# Patient Record
Sex: FEMALE | Race: BLACK OR AFRICAN AMERICAN | NOT HISPANIC OR LATINO | Employment: UNEMPLOYED | URBAN - METROPOLITAN AREA
[De-identification: names, ages, dates, MRNs, and addresses within clinical notes are randomized per-mention and may not be internally consistent; named-entity substitution may affect disease eponyms.]

---
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
## 2022-05-10 ENCOUNTER — PATIENT OUTREACH (OUTPATIENT)
Dept: CARE COORDINATION | Facility: CLINIC | Age: 22
End: 2022-05-10

## 2022-05-10 DIAGNOSIS — F32.A DEPRESSION: Primary | ICD-10-CM

## 2022-06-21 ENCOUNTER — PATIENT OUTREACH (OUTPATIENT)
Dept: CARE COORDINATION | Facility: CLINIC | Age: 22
End: 2022-06-21

## 2023-06-11 ENCOUNTER — HOSPITAL ENCOUNTER (EMERGENCY)
Facility: CLINIC | Age: 23
Discharge: HOME | End: 2023-06-11
Attending: EMERGENCY MEDICINE | Admitting: EMERGENCY MEDICINE
Payer: COMMERCIAL

## 2023-06-11 VITALS
HEART RATE: 96 BPM | OXYGEN SATURATION: 96 % | BODY MASS INDEX: 20.73 KG/M2 | TEMPERATURE: 98.1 F | DIASTOLIC BLOOD PRESSURE: 84 MMHG | WEIGHT: 117 LBS | RESPIRATION RATE: 16 BRPM | SYSTOLIC BLOOD PRESSURE: 140 MMHG | HEIGHT: 63 IN

## 2023-06-11 DIAGNOSIS — F32.A DEPRESSION, UNSPECIFIED DEPRESSION TYPE: ICD-10-CM

## 2023-06-11 DIAGNOSIS — R45.4 ANGER: ICD-10-CM

## 2023-06-11 LAB — SARS-COV-2 RNA RESP QL NAA+PROBE: NEGATIVE

## 2023-06-11 PROCEDURE — 87635 SARS-COV-2 COVID-19 AMP PRB: CPT | Performed by: EMERGENCY MEDICINE

## 2023-06-11 PROCEDURE — 99283 EMERGENCY DEPT VISIT LOW MDM: CPT

## 2023-06-12 NOTE — ED PROVIDER NOTES
"  History     Chief Complaint:  Psychiatric Evaluation       HPI   Sara Brunner is a 22 year old female with history of ADHD who presents with anger and destructive behavior due to worsening anxiety and depression. She denies suicidal thoughts and thoughts of self harm. Here in the ED she declines to be seen by [facility]. Patient has therapist and has a follow up this week. She has been taking her psychiatric medications and denies recreational drug use. Patient's partner feels that she is safe to go home.  She feels better and does not want to be seen tonight.      Independent Historian:   Spouse/Partner - They report feeling that she is safe to go home.     Review of External Notes:   none       Medications:    The patient is not currently taking any prescribed medications.     Past Medical History:    The patient denies any significant past medical history.      Physical Exam     Patient Vitals for the past 24 hrs:   BP Temp Temp src Pulse Resp SpO2 Height Weight   06/11/23 2122 (!) 140/84 98.1  F (36.7  C) Temporal 96 16 96 % 1.6 m (5' 3\") 53.1 kg (117 lb)        Physical Exam  Nursing note and vitals reviewed.  Constitutional:  Appears comfortable.   Eyes:    Conjunctivae are normal. No jaundice.  Pupils equal  Cardiovascular:  Normal rate, regular rhythm.      Normal heart sounds.     No murmur heard.  Pulmonary/Chest:  Breath sounds clear. No respiratory distress.     No stridor.   Neurological:   Alert and appropriate. No focal weakness.    Skin:    Skin is warm and dry. No rash noted. No diaphoresis.   Psychiatric:   Patient makes poor eye contact has a flat affect but otherwise is appropriate.  She is not delusional or psychotic.  She is very engaging.    Emergency Department Course   Laboratory:  Labs Ordered and Resulted from Time of ED Arrival to Time of ED Departure   COVID-19 VIRUS (CORONAVIRUS) BY PCR - Normal       Result Value    SARS CoV2 PCR Negative            Emergency Department Course & " ----- Message from Ema Smith sent at 9/14/2018 11:35 AM PDT -----  Regarding: Prescription Question  Contact: [phone]  Hi Dr. Casanova,  I have been unable to fill my prescription for the Voltaren Gel because there were no usage directions for [facility] to put on the prescription. They've tried reaching your office but have not received a response. Can you or someone in the office look into this please?  Thank you!  Ema Smith   Assessments:  Interventions:  Medications - No data to display     Assessments:  2148 I obtained history and examined the patient as noted above.     Independent Interpretation (X-rays, CTs, rhythm strip):  None    Consultations/Discussion of Management or Tests:  None     Social Determinants of Health affecting care:   None    Disposition:  The patient was discharged to home.     Impression & Plan    Medical Decision Making:  Patient comes in with some increase in her anxiety and depression.  She is been angry recently.  After arrival, she feels like she is feeling better and does not want to be here.  Her partner is comfortable with her going home.  She has an appoint with a therapist this week and will keep it.  She will continue to take her medications.  I do not feel she is an immediate danger to herself or others denies suicidal ideation.  I feel she is safe to be home but will return to the ER if she does not feel safe and has suicidal thoughts or her emotions get out of control.      Keep your appoint with your therapist this week.  Continue your medications.  If it anytime you do not feel safe and need to be seen again, return to the emergency room.    Diagnosis:    ICD-10-CM    1. Depression, unspecified depression type  F32.A       2. Anger  R45.4              Jed Blanc  6/11/2023   Lamar Hannon MD Powell, Tracy Alan, MD  06/11/23 8113

## 2023-06-12 NOTE — ED TRIAGE NOTES
Patient presents to ED requesting psych eval.  She reports having destructive behaviors recently where she breaks things due to anger.  She feels these behaviors are due to worsening depression.  She takes medication for depression.  She denies suicidal thoughts or thoughts of self harm.     Triage Assessment     Row Name 06/11/23 2123       Triage Assessment (Adult)    Airway WDL WDL       Respiratory WDL    Respiratory WDL WDL       Skin Circulation/Temperature WDL    Skin Circulation/Temperature WDL WDL       Cardiac WDL    Cardiac WDL WDL       Peripheral/Neurovascular WDL    Peripheral Neurovascular WDL WDL       Cognitive/Neuro/Behavioral WDL    Cognitive/Neuro/Behavioral WDL X;all     Level of Consciousness alert    Arousal Level opens eyes spontaneously    Orientation oriented x 4    Speech clear    Mood/Behavior flat affect;hypoactive (quiet, withdrawn)

## 2023-06-12 NOTE — DISCHARGE INSTRUCTIONS
Keep your appoint with your therapist this week.  Continue your medications.  If it anytime you do not feel safe and need to be seen again, return to the emergency room.

## 2023-06-16 ENCOUNTER — PATIENT OUTREACH (OUTPATIENT)
Dept: CARE COORDINATION | Facility: CLINIC | Age: 23
End: 2023-06-16
Payer: COMMERCIAL

## 2023-07-06 ENCOUNTER — TELEPHONE (OUTPATIENT)
Dept: BEHAVIORAL HEALTH | Facility: CLINIC | Age: 23
End: 2023-07-06
Payer: COMMERCIAL

## 2023-07-06 NOTE — TELEPHONE ENCOUNTER
Pt is a(n) adult (18+ out of HS) Seeking as eval for Adult Mental Health DA for evaluation and recommendations..  Appointment scheduled by:  Parent/Guardian (Guardianship confirmed, run cost estimate.  If not, do not run)  Caller name:  Mother      Legal Guardianship Reviewed?  No  Honoring Choices Notified?  No       needed?  NO    Contact information verified/updated: Yes    Aliza Sanchez

## 2023-07-13 ENCOUNTER — TELEPHONE (OUTPATIENT)
Dept: BEHAVIORAL HEALTH | Facility: CLINIC | Age: 23
End: 2023-07-13
Payer: COMMERCIAL

## 2023-07-13 NOTE — TELEPHONE ENCOUNTER
Pt have a in-person appt on Wednesday 7/19/23 at 11am. Writer placed a appt reminder phone call on 7/13/23. Unable to get a hold of patient at:[phone]. Phone call drops. Call can not be connected.  Writer could not reach pt and leave a vm behind.

## 2023-07-19 ENCOUNTER — HOSPITAL ENCOUNTER (OUTPATIENT)
Dept: BEHAVIORAL HEALTH | Facility: CLINIC | Age: 23
Discharge: HOME | End: 2023-07-19
Attending: FAMILY MEDICINE | Admitting: FAMILY MEDICINE
Payer: COMMERCIAL

## 2023-07-19 ENCOUNTER — TELEPHONE (OUTPATIENT)
Dept: BEHAVIORAL HEALTH | Facility: CLINIC | Age: 23
End: 2023-07-19
Payer: COMMERCIAL

## 2023-07-19 DIAGNOSIS — F90.2 ATTENTION DEFICIT HYPERACTIVITY DISORDER (ADHD), COMBINED TYPE: ICD-10-CM

## 2023-07-19 DIAGNOSIS — F33.1 MODERATE EPISODE OF RECURRENT MAJOR DEPRESSIVE DISORDER (H): Primary | ICD-10-CM

## 2023-07-19 PROCEDURE — 90791 PSYCH DIAGNOSTIC EVALUATION: CPT | Performed by: COUNSELOR

## 2023-07-19 RX ORDER — SERTRALINE HYDROCHLORIDE 100 MG/1
TABLET, FILM COATED ORAL
COMMUNITY
Start: 2023-06-21

## 2023-07-19 RX ORDER — LISDEXAMFETAMINE DIMESYLATE 20 MG/1
CAPSULE ORAL
COMMUNITY
Start: 2023-05-22

## 2023-07-19 ASSESSMENT — ANXIETY QUESTIONNAIRES
1. FEELING NERVOUS, ANXIOUS, OR ON EDGE: NOT AT ALL
GAD7 TOTAL SCORE: 8
GAD7 TOTAL SCORE: 8
7. FEELING AFRAID AS IF SOMETHING AWFUL MIGHT HAPPEN: NOT AT ALL
2. NOT BEING ABLE TO STOP OR CONTROL WORRYING: NOT AT ALL
6. BECOMING EASILY ANNOYED OR IRRITABLE: NEARLY EVERY DAY
IF YOU CHECKED OFF ANY PROBLEMS ON THIS QUESTIONNAIRE, HOW DIFFICULT HAVE THESE PROBLEMS MADE IT FOR YOU TO DO YOUR WORK, TAKE CARE OF THINGS AT HOME, OR GET ALONG WITH OTHER PEOPLE: EXTREMELY DIFFICULT
3. WORRYING TOO MUCH ABOUT DIFFERENT THINGS: NOT AT ALL
5. BEING SO RESTLESS THAT IT IS HARD TO SIT STILL: MORE THAN HALF THE DAYS

## 2023-07-19 ASSESSMENT — COLUMBIA-SUICIDE SEVERITY RATING SCALE - C-SSRS
2. HAVE YOU ACTUALLY HAD ANY THOUGHTS OF KILLING YOURSELF?: NO
TOTAL  NUMBER OF INTERRUPTED ATTEMPTS LIFETIME: NO
6. HAVE YOU EVER DONE ANYTHING, STARTED TO DO ANYTHING, OR PREPARED TO DO ANYTHING TO END YOUR LIFE?: NO
ATTEMPT LIFETIME: NO
TOTAL  NUMBER OF ABORTED OR SELF INTERRUPTED ATTEMPTS LIFETIME: NO
1. HAVE YOU WISHED YOU WERE DEAD OR WISHED YOU COULD GO TO SLEEP AND NOT WAKE UP?: NO

## 2023-07-19 ASSESSMENT — PATIENT HEALTH QUESTIONNAIRE - PHQ9
SUM OF ALL RESPONSES TO PHQ QUESTIONS 1-9: 18
5. POOR APPETITE OR OVEREATING: NEARLY EVERY DAY

## 2023-07-19 NOTE — TELEPHONE ENCOUNTER
Added pt to [facility] referrals list as next LoC is not yet scheduled. Please f/u accordingly after clinical review.     Chacha Thomas MALDONADO  7.19.2023  [id number]      ----- Message from Daria Key [facility] sent at 7/19/2023  2:23 PM CDT -----  Regarding: referral  Transition Clinic Referral   [place]         Please Check Type of Referral Requested:       __X__THERAPY: The Transition clinic is able to schedule patients without current medical insurance; these patient will be referred to our Social Work Care Coordinator for Medical Insurance              Assistance. We are open for referral for psychotherapy. Patient is referred from:  [facility]      ____MEDICATION:  Referrals for Medication are ONLY accepted from the following areas (select): [facility] - [facility] PRIORITY                                       Suboxone and Opioid Management Referrals are automatically denied. [facility] cannot see patient without active medical insurance.     [facility] cannot accept patient with next level of care scheduled with PCP  The transition clinic cannot follow patients who are on a restricted recipient program.    GUARDIAN: If your patient is not their own Guardian, please provide the following:    Guardian Name:  Guardian Contact Information (Phone & Email) :  Guardian Address:     FOSTER CARE PROVIDER: If your patient lives at a Licensed Foster Care, please provide the following:    Foster Provider:  Foster Provider Contact Information (Phone & Email):  Foster Provider Address:         Referring Provider Contact Name: Daria Key; Phone Number: [phone]    Reason for Transition Clinic Referral: bridge services    Next Level of Care Patient Will Be Transitioned To: individual therapy  Provider(s)[facility]  Location TBD  Date/Time TBS    What Would Be Helpful from the Transition Clinic: bridge services     Needs: NO    Does Patient Have Access to Technology: yes    Patient E-mail Address:  [email or web address]    Current Patient Phone Number: [phone];     Clinician Gender Preference (if applicable): NO    Patient location preference: [place]

## 2023-07-19 NOTE — PROGRESS NOTES
"    [facility]      PATIENT'S NAME: Sara Campbell  PREFERRED NAME: Sara  PRONOUNS: she/her/hers     MRN: [id number]  : 2000  ADDRESS: [address] [place] [id number]  [facility]  [id number]  DATE OF SERVICE: 23  START TIME: 1130  END TIME: 1330  PREFERRED PHONE: [phone]  May we leave a program related message: Yes  SERVICE MODALITY:  In-person    [place] ADULT Mental Health DIAGNOSTIC ASSESSMENT    Identifying Information:  Patient is a 22 year old,    individual.  Patient was referred for an assessment by self .  Patient attended the session alone.    Chief Complaint:   The reason for seeking services at this time is: \" help with ADD/ADHD \"   The problem(s) began as a teenager. Patient has attempted to resolve these concerns in the past through therapy and medications.    Social/Family History:  Patient reported they grew up in [facility] at age 8.  They were raised by biological parents.  Parents stayed ..   Patient reported that their childhood was \"good\".  Patient described their current relationships with family of origin as \"I have been isolating though they are supportive\".      The patient describes their cultural background as \"Tongan, [place], White, Agnostic\".  Cultural influences and impact on patient's life structure, values, norms, and healthcare: Patient denies.  Contextual influences on patient's health include: Contextual Factors: Individual Factors Substance Use.  Cultural, Contextual, and socioeconomic factors do not affect the patient's access to services.  These factors will be addressed in the Preliminary Treatment plan.  Patient identified their preferred language to be English. Patient reported they do not  need the assistance of an  or other support involved in therapy.     Patient reported experienced significant delays in developmental tasks, " such as speech, walking.   Patient's highest education level was some college. Patient identified the following learning problems: attention and concentration.  Modifications will not be used to assist communication in therapy.   Patient reports they are  able to understand written materials.    Patient reported the following relationship history:  Patient currently is in a relationship.  Patient's current relationship status is partnered / significant other for two months.   Patient identified their sexual orientation as pansexual.  Patient reported having zero child(nakul). Patient identified partner, mother and siblings as part of their support system.  Patient identified the quality of these relationships as stable and meaningful.     Patient's current living/housing situation involves staying with mom, dad and younger sister.  They live with family and they report that housing is stable.     Patient is currently unemployed.  Patient reports their finances are obtained through parents.  Patient does identify finances as a current stressor.      Patient reported that they have not been involved with the legal system.   Patient denies being on probation / parole / under the jurisdiction of the court.      Patient's Strengths and Limitations:  Patient identified the following strengths or resources that will help them succeed in treatment: commitment to health and well being. Things that may interfere with the patient's success in treatment include: none identified.     Assessments:  The following assessments were completed by patient for this visit:  PHQ9:       7/19/2023    12:21 PM   PHQ-9 SCORE   PHQ-9 Total Score 18     GAD7:       7/19/2023    12:21 PM   LJ-7 SCORE   Total Score 8     CAGE-AID:       7/19/2023    12:21 PM   CAGE-AID Total Score   Total Score 4     PROMIS 10-Global Health (only subscores and total score):       7/19/2023    12:00 PM   PROMIS-10 Scores Only   Global Mental Health Score 6    Global Physical Health Score 15   PROMIS TOTAL - SUBSCORES 21     [place] Suicide Severity Rating Scale (Lifetime/Recent)      7/19/2023    12:00 PM   [place] Suicide Severity Rating (Lifetime/Recent)   1. Wish to be Dead (Lifetime) N   2. Non-Specific Active Suicidal Thoughts (Lifetime) N   Actual Attempt (Lifetime) N   Has subject engaged in non-suicidal self-injurious behavior? (Lifetime) Y   Has subject engaged in non-suicidal self-injurious behavior? (Past 3 Months) N   Interrupted Attempts (Lifetime) N   Aborted or Self-Interrupted Attempt (Lifetime) N   Preparatory Acts or Behavior (Lifetime) N   Calculated C-SSRS Risk Score (Lifetime/Recent) No Risk Indicated       Personal and Family Medical History:  Patient does not report a family history of mental health concerns.  Patient reports family history is not on file..     Patient does report Mental Health Diagnosis and/or Treatment.  Patient Patient reported the following previous diagnoses which include(s): ADHD and Depression.  Patient reported symptoms began age 14.   Patient has received mental health services in the past: therapy, medications.  Psychiatric Hospitalizations: None.  Patient denies a history of civil commitment.  Patient is receiving other mental health services.  These include primary care provider at Dr Granados.  For follow-up on TBS.         Patient has had a physical exam to rule out medical causes for current symptoms.  Date of last physical exam was within the past year. Client was encouraged to follow up with PCP if symptoms were to develop. The patient has a non-[place] Primary Care Provider. Their PCP is through [facility]..  Patient reports the following current dental concerns: Patient getting a lot of cavities.  Patient denies any issues with pain..   There are not significant appetite / nutritional concerns / weight changes. These may include: no concerns. Patient reports the following sleep concerns:  Sleep  "cycles change and will struggle to fall asleep.   Patient does not report a history of head injury / trauma / cognitive impairment.      Patient reports current meds as:   Outpatient Medications Marked as Taking for the 7/19/23 encounter (Hospital Encounter) with Daria Kye [facility]   Medication Sig     sertraline (ZOLOFT) 100 MG tablet      VYVANSE 20 MG capsule        Medication Adherence:  Patient reports not taking psychiatric medications as prescribed. Client states reason for medication non-adherence as \"just taking it because of my mom\". Strategies for addressing obstacles to medication adherence include discussing how it will improve mood. Client accepted strategies to improve medication adherence.    Patient Allergies:  No Known Allergies    Medical History:  History reviewed. No pertinent past medical history.      Current Mental Status Exam:   Appearance:  Appropriate    Eye Contact:  Good   Psychomotor:  Normal       Gait / station:  no problem  Attitude / Demeanor: Cooperative  Interested  Speech      Rate / Production: Normal/ Responsive      Volume:  Normal  volume      Language:  intact  Mood:   Normal  Affect:   Appropriate    Thought Content: Clear   Thought Process: Logical       Associations: No loosening of associations  Insight:   Good   Judgment:  Intact   Orientation:  All  Attention/concentration: Good        Substance Use:  Patient did not report a family history of substance use concerns; see medical history section for details.  Patient has not received chemical dependency treatment in the past.  Patient has not ever been to detox.      Patient is not currently receiving any chemical dependency treatment. Patient reported the following problems as a result of their substance use:  family problems.    Patient reports using alcohol 3 times per week and has 3 mixed drinks at a time. Patient first started drinking at age 22.  Patient reported date of last use was 7/18/23.  Patient reports " heaviest use is current use.  Patient denies using tobacco.  Patient denies using cannabis.  Patient denies using caffeine.  Patient reports using/abusing the following substance(s). Patient reported no other substance use.     Substance Use: substance use at school and cravings/urges to use    Based on the positive CAGE score and clinical interview there  are indications of drug or alcohol abuse. Diagnostic assessment for substance use disorder completed. Therapist did recommend client to reduce use or abstain from alcohol or substance use. Therapist did not recommend structured treatment and or community support (AA, 12 step group, etc.). Patient to continue to discuss substance use  in individual therapy.      Significant Losses / Trauma / Abuse / Neglect Issues:   Patient   did not serve in the .  There are indications or report of significant loss, trauma, abuse or neglect issues related to: are no indications and client denies any losses, trauma, abuse, or neglect concerns.  Concerns for possible neglect are not present.     Safety Assessment:   Patient denies current homicidal ideation and behaviors.  Patient denies current self-injurious ideation and behaviors.    Patient denied risk behaviors associated with substance use.  Patient denies any high risk behaviors associated with mental health symptoms.  Patient reports the following current concerns for their personal safety: None.  Patient reports there   are not firearms in the house.       There are no firearms in the home..    History of Safety Concerns:  Patient denied a history of homicidal ideation.     Patient denied a history of personal safety concerns.    Patient denied a history of assaultive behaviors.    Patient denied a history of sexual assault behaviors.     Patient denied a history of risk behaviors associated with substance use.  Patient denies any history of high risk behaviors associated with mental health symptoms.  Patient  reports the following protective factors:  future focused thinking    Risk Plan:  See Recommendations for Safety and Risk Management Plan    Review of Symptoms per patient report:   Depression: Change in sleep, Lack of interest, Excessive or inappropriate guilt, Change in energy level, Difficulties concentrating, Change in appetite, Feelings of hopelessness, Ruminations, Irritability, Feeling sad, down, or depressed and Withdrawn  Saritha:  No Symptoms  Psychosis: No Symptoms  Anxiety: Sleep disturbance and Irritability  Panic:  No symptoms  Post Traumatic Stress Disorder:  No Symptoms   Eating Disorder: No Symptoms  ADD / ADHD:  Inattentive, Difficulties listening, Poor task completion, Poor organizational skills, Distractibility, Forgetful, Interrupts, Intrudes, Impulsive, Restlessness/fidgety, Hyperverbal and Hyperactive  Conduct Disorder: No symptoms  Autism Spectrum Disorder: Sensory issues, struggles with non verbal facial expressions, struggles with developing, maintaining and understanding relationships  Obsessive Compulsive Disorder: Obsessions    Patient reports the following compulsive behaviors and treatment history: Patient denies.      Diagnostic Criteria:   Major Depressive Disorder  CRITERIA (A-C) REPRESENT A MAJOR DEPRESSIVE EPISODE - SELECT THESE CRITERIA  A) Recurrent episode(s) - symptoms have been present during the same 2-week period and represent a change from previous functioning 5 or more symptoms (required for diagnosis)   - Depressed mood. Note: In children and adolescents, can be irritable mood.     - Diminished interest or pleasure in all, or almost all, activities.    - Fatigue or loss of energy.    - Feelings of worthlessness or inappropriate guilt.    - Diminished ability to think or concentrate, or indecisiveness.   B) The symptoms cause clinically significant distress or impairment in social, occupational, or other important areas of functioning  C) The episode is not attributable to  the physiological effects of a substance or to another medical condition  D) The occurence of major depressive episode is not better explained by other thought / psychotic disorders  E) There has never been a manic episode or hypomanic episode    Functional Status:  Patient reports the following functional impairments:  management of the household and or completion of tasks, relationship(s) and self-care.     Nonprogrammatic care:  Patient is requesting basic services to address current mental health concerns.    Clinical Summary:  1. Reason for assessment: to determine level of care  .  2. Psychosocial, Cultural and Contextual Factors: Substance use  .  3. Principal DSM5 Diagnoses  (Sustained by DSM5 Criteria Listed Above):   296.32 (F33.1) Major Depressive Disorder, Recurrent Episode, Moderate _ and With anxious distress.  4. Other Diagnoses that is relevant to services:   Attention-Deficit/Hyperactivity Disorder  314.01 (F90.9) Unspecified Attention -Deficit / Hyperactivity Disorder per history  5. Provisional Diagnosis: Further diagnosis clarification will be beneficial.  6. Prognosis: Expect Improvement.  7. Likely consequences of symptoms if not treated: higher level of care.  8. Client strengths include:  supportive, wants to learn, willing to ask questions and willing to relate to others .     Recommendations:     1. Plan for Safety and Risk Management:   Safety and Risk: A safety and risk management plan has been developed including: When the [place] Suicide Severity Rating Scale has been completed, the patient identifies lifetime history of suicidal ideation and/or Suicidal Behavior that is greater than 10 years.      The recommendation is to provide the Brief Safety Plan:    Adult Short Safety Plan:   Name: Sara Campbell  YOB: 2000  Date: July 19, 2023   My primary care provider: Rekha Granados     My Triggers:  Substance Use .     Additional People, Places, and Things that I  can access for support: family, girlfriend                JOSE    Good Control  1. I feel good  2. No suicidal thoughts   3. Can work, sleep and play      Action Steps  1. Self-care: balanced meals, exercising, sleep practices, etc.  2. Take your medications as prescribed.  3. Continue meetings with therapist and prescriber.  4.  Do the healthy things that I enjoy.           YELLOW  Getting Worse  I have ANY of these:  1. I do not feel good  2. Difficulty Concentrating  3. Sleep is changing  4. Increase/Change in my thoughts to hurt self and/or others, but I can still manage and not act on it.   5. Not taking care of self.               Action Steps (in addition to the above):  1. Inform your therapist and psychiatric prescriber/PCP.  2. Keep taking your medications as prescribed.    3. Turn to people you can ask for help.  4. Use internal coping strategies -see below.  5. Create safe environment: notify friends/family of increase in symptoms             RED  Get Help  If I have ANY of these:  1. Current and uncontrollable thoughts and/or behaviors to hurt self and/or others.    Actions to manage my safety  1. Contact your emergency person   2. Call or Text [phone]  3. Call my crisis team- [facility] [phone] [facility] Emergencies  3. Or Call 911 or go to the emergency room right away          My Internal Coping Strategies include the following:  use my coping skills    [End for Brief Safety Plan]     Safety Concerns  How To Identify Situations That Make Your Mental Health Worse:  Triggers are things that make your mental health worse.  Look at the list below to help you find your triggers and what you can do about them.     1. Identify Early Warning Signs:    Sometimes symptoms return, even when people do their best to stay well. Symptoms can develop over a short period of time with little or no warning, but most of the time they emerge gradually over several weeks.  Early warning signs  are changes that people experience when a relapse is starting. Some early warning signs are common and others are not as common.   Common Early Warning Signs:    Feeling depressed or low, Feeling irritable, Feeling like not being around other people and Urges to use drugs or alcohol     2. Identify action steps to take when warning signs are noticed:    Taking Action- It is important to take action if you are experiencing early warning signs of a relapse.  The faster you act, the more likely it is that you can avoid a full relapse.  It is helpful to identify several specific ways to cope with symptoms.      The following is my list of symptoms and coping strategies that I can use when they are present:    Symptom Coping Strategies   Anxiety -Talk with someone in your support system and let him or her know how you are feeling.  -Use relaxation techniques such as deep breathing or imagery.  -Use positive affirmations to counteract negative self-talk such as  I am learning to let go of worry.    Depression - Schedule your day; include activities you have to do and activities you enjoy doing.  - Get some exercise - walk, run, bike, or swim.  - Give yourself credit for even the smallest things you get done.   Sleep Difficulties   - Go to sleep at the same time every day.  - Do something relaxing before bed, such as drinking herbal tea or listening to music.  - Avoid having discussions about upsetting topics before going to bed.   Delusions   - Distract yourself from the disturbing thought by doing something that requires your attention such as a puzzle.  - Check out your beliefs by talking to someone you trust.    Hallucinations   - Use headphones to listen to music.  - Tell voices to  stop  or say to yourself,  I am safe.   - Ignore the hallucinations as much as possible; focus on other things.   Concentration Difficulties - Minimize distractions so there is only one thing for you to focus on at a time.    - Ask the  person you are having a conversation with to slow down or repeat things you are unsure of.           .  Patient consented to co-developed safety plan.  Safety and risk management plan was completed.  Patient agreed to use safety plan should any safety concerns arise.  A copy was given to the patient..          Report to child / adult protection services was NA.     2. Patient's identified None identified.     3. Initial Treatment will focus on:    Depressed Mood - .  Attentional Problems - ..     4. Resources/Service Plan:    services are not indicated.   Modifications to assist communication are not indicated.   Additional disability accommodations are not indicated.      5. Collaboration:   Collaboration / coordination of treatment will be initiated with the following  support professionals: PCP   6.  Referrals:   The following referral(s) will be initiated: Outpatient Mental [facility]  Psychological Testing. Next Scheduled Appointment: Referrals placed through [facility].      A Release of Information has been obtained for the following: Emergency Contact.     Emergency Contact Delon Campbell was obtained.      Clinical Substantiation/medical necessity for the above recommendations:    Patient is a 22 year old who presents with concerns with mood.  Patient reports history of attention and concentration concerns, Depression concerns.  Patient has historically been able to manage symptoms through therapy and medications.     Patients acute suicide risk was determined to be  minimal due to the following factors: Patient denies current thoughts of suicidal ideation and self harm and reports ability to keep self safe.  Patient completed and reviewed safety plan with  and reports ability to follow plan.    Patient is not currently under the influence of alcohol or illicit substances, denies experiencing command hallucinations, and has no immediate access to firearms. Protective factors  include: Patient reports the following protective factors: dedication to family/friends, safe and stable environment, daily obligations, committment to well-being, sense of personal control or determination and access to a variety of clinical interventions    Patient identifies the following concerns with substance use: Patient denies current concerns though does report that alcohol is being used to manage mood as they report their medication are not effective.   discussed approaches to manage substance use and discussed substance use treatment history and support group participation.       Patient would benefit from more extensive mental health support such individual therapy to help mitigate risk of hospitalization or suicidality. Patient is in agreement with plan. Options for treatment and follow-up care were reviewed with the patient. Patient was engaged and actively involved in the decision making process. Patient verbalized understanding of the options discussed and was satisfied with the final plan.  Patient is referred to: individual therapy and psychological testing.  Patient declined alternative placement options at this time and agreed to reach out to  should this change.  Contact information was provided.      7. MIN:    MIN:  Discussed the general effects of drugs and alcohol on health and well-being and Discussed the impact of drugs and alcohol when used during pregnancy. Provider gave patient printed information about the  effects of chemical use on their health and well being. Recommendations:  To abstain from all mood altering substances .     8. Records:   These were reviewed at time of assessment.   Information in this assessment was obtained from the medical record and  provided by patient who is a good historian.    Patient will have open access to their mental health medical record.    9.   Interactive Complexity: No      Provider Name/ Credentials:  Daria MACIAS  [facility]  July 19, 2023

## 2023-07-20 ENCOUNTER — TELEPHONE (OUTPATIENT)
Dept: BEHAVIORAL HEALTH | Facility: CLINIC | Age: 23
End: 2023-07-20
Payer: COMMERCIAL

## 2023-07-20 NOTE — TELEPHONE ENCOUNTER
Lamar Cardozo, [facility]  P Tc Referrals For Review  Please proceed with scheduling this patient with Kely.  Please note that a DA is not necessary as one was completed yesterday by [facility].  Purpose of TC sessions will be to focus on immediate intervention strategies in order to avoid further life complications as well as hospitalizations.  Evaluate next step options with [facility].     Coordinator reached patient who prefers in person appointment. Appointment scheduled 7/262023 with Blayne    Linda Ayoub  Transition Clinic Coordinator  Date and Time: 07/20/23 8:22 AM        ----- Message from Daria Key [facility] sent at 7/19/2023  2:23 PM CDT -----  Regarding: referral  Transition Clinic Referral   [place]         Please Check Type of Referral Requested:       __X__THERAPY: The Transition clinic is able to schedule patients without current medical insurance; these patient will be referred to our Social Work Care Coordinator for Medical Insurance              Assistance. We are open for referral for psychotherapy. Patient is referred from:  [facility]      ____MEDICATION:  Referrals for Medication are ONLY accepted from the following areas (select): [facility] - HIGH PRIORITY                                       Suboxone and Opioid Management Referrals are automatically denied. TC Psychiatry cannot see patient without active medical insurance.     TC Psychiatry cannot accept patient with next level of care scheduled with PCP  The transition clinic cannot follow patients who are on a restricted recipient program.    GUARDIAN: If your patient is not their own Guardian, please provide the following:    Guardian Name:  Guardian Contact Information (Phone & Email) :  Guardian Address:     FOSTER CARE PROVIDER: If your patient lives at a Licensed Foster Care, please provide the following:    Foster Provider:  Foster Provider Contact Information (Phone & Email):  Foster Provider Address:          Referring Provider Contact Name: Daria Robertopina; Phone Number: [phone]    Reason for Transition Clinic Referral: bridge services    Next Level of Care Patient Will Be Transitioned To: individual therapy  Provider(s)[facility]  Location TBD  Date/Time TBS    What Would Be Helpful from the Transition Clinic: bridge services     Needs: NO    Does Patient Have Access to Technology: yes    Patient E-mail Address: [email or web address]    Current Patient Phone Number: [phone];     Clinician Gender Preference (if applicable): NO    [place] [facility]

## 2023-07-26 ENCOUNTER — OFFICE VISIT (OUTPATIENT)
Dept: BEHAVIORAL HEALTH | Facility: CLINIC | Age: 23
End: 2023-07-26
Payer: COMMERCIAL

## 2023-07-26 DIAGNOSIS — F90.2 ATTENTION DEFICIT HYPERACTIVITY DISORDER (ADHD), COMBINED TYPE: ICD-10-CM

## 2023-07-26 DIAGNOSIS — F33.1 MODERATE EPISODE OF RECURRENT MAJOR DEPRESSIVE DISORDER (H): Primary | ICD-10-CM

## 2023-07-26 ASSESSMENT — COLUMBIA-SUICIDE SEVERITY RATING SCALE - C-SSRS
6. HAVE YOU EVER DONE ANYTHING, STARTED TO DO ANYTHING, OR PREPARED TO DO ANYTHING TO END YOUR LIFE?: NO
1. SINCE LAST CONTACT, HAVE YOU WISHED YOU WERE DEAD OR WISHED YOU COULD GO TO SLEEP AND NOT WAKE UP?: NO
SUICIDE, SINCE LAST CONTACT: NO
TOTAL  NUMBER OF ABORTED OR SELF INTERRUPTED ATTEMPTS SINCE LAST CONTACT: NO
TOTAL  NUMBER OF INTERRUPTED ATTEMPTS SINCE LAST CONTACT: NO
2. HAVE YOU ACTUALLY HAD ANY THOUGHTS OF KILLING YOURSELF?: NO
ATTEMPT SINCE LAST CONTACT: NO

## 2023-07-26 NOTE — PROGRESS NOTES
"    Transition Clinic - Initial Visit Progress Note    Patient  Name: Sara Campbell, : 2000    Date:  2023       Service Type:  Mental Health Initial Visit     Visit Start Time: 1406 Visit End Time: 1446    Session Length:   TC Session Length: 45 (38-52 Minutes)    Visit #: 1    Attendees:  TC Attendees: Client alone    Service Modality:  Service Modality: In-Person    Informed Consent and Assessment Methods    This provider and patient discussed HIPAA, and the limits of confidentiality; including mandated reporting, the possibility of collaborative discussions with patient's primary care provider and the multidisciplinary team in the  clinic during consultation.  Discussed the no show policy, and the benefits and possible unintended consequences of therapy.  Patient indicated understanding Transition Clinic services are short term, solution focused, until a referral can be made and patient can bridge to long term therapy.  Patient verbally indicated understanding the informed consent.       Presenting Concerns/  Current Stressors:  Sara Campbell is a 22 year old identified female who was referred to the Transition Clinic by AC for individual bridging therapy while she waits for a long-term therapist. AC  placed referral to [facility] but pt denied having scheduled any appointments. None were found in epic at time of this session.    Sara \"Make\" Brigitte Campbell reports she has been experiencing depressive sxs since apx age 14 and that they have continued to intensify, not responding to treatment. Phe was diagnosed with ADHD as a teenager and was on medication, which she endorsed as helpful in finishing high school but unhelpful in reducing her depressive sxs. Pt reported she is not currently taking ADHD medication but continues to take zoloft despite feeling that it \"isn't doing anything.\" Pt shared the only thing she has found that makes her feel better is alcohol, which she first began " "drinking at age 22. Pt and writer reviewed DA completed on 7.19.2023 and writer provided education related to different types of depression as well as effects of alcohol on mental health. Writer guided discussion to assess for motivation and identify supports. Pt reported multiple protective factors, including family and friends and endorsed low motivation to continue to treat depressive sxs stating, \"what's the point? Nothing works.\" Pt is clearly frustrated over her depressive sxs and seeming inability, to date, to find relief. Throughout session, pt presented with flat affect and seemed younger than her age of 22, holding a stuffed animal throughout the session. She provided minimal and brief answers to questions and asked \"are we done? I'm just really tired.\" Pt agreed to a follow-up session, which was scheduled for 8.03.2023. PT denied current SI, plan or intent at the time of this session.     ASSESSMENT:  The following assessments were completed by patient for this visit:  PHQ9:       7/19/2023    12:21 PM   PHQ-9 SCORE   PHQ-9 Total Score 18     GAD7:       7/19/2023    12:21 PM   LJ-7 SCORE   Total Score 8     CAGE-AID:       7/19/2023    12:21 PM   CAGE-AID Total Score   Total Score 4     PROMIS 10-Global Health (only subscores and total score):       7/19/2023    12:00 PM   PROMIS-10 Scores Only   Global Mental Health Score 6   Global Physical Health Score 15   PROMIS TOTAL - SUBSCORES 21     [place] Suicide Severity Rating Scale (Short Version)      6/11/2023     9:27 PM 7/26/2023     2:00 PM   [place] Suicide Severity Rating (Short Version)   Over the past 2 weeks have you felt down, depressed, or hopeless? yes    Over the past 2 weeks have you had thoughts of killing yourself? no    Have you ever attempted to kill yourself? no    1. Wish to be Dead (Since Last Contact)  N   2. Non-Specific Active Suicidal Thoughts (Since Last Contact)  N   Actual Attempt (Since Last Contact)  N   Has subject engaged " in non-suicidal self-injurious behavior? (Since Last Contact)  N   Interrupted Attempts (Since Last Contact)  N   Aborted or Self-Interrupted Attempt (Since Last Contact)  N   Preparatory Acts or Behavior (Since Last Contact)  N   Suicide (Since Last Contact)  N   Calculated C-SSRS Risk Score (Since Last Contact)  No Risk Indicated       Mental Status Assessment:  Appearance:   Appropriate   Eye Contact:   Good   Psychomotor Behavior: Normal   Attitude:   Dallin Indifferent  Orientation:   All  Speech     Rate / Production:  Normal/ Responsive     Volume:   Normal   Mood:    Depressed   Affect:    Flat   Thought Content:  Clear   Thought Form:  Goal Directed   Insight:    Fair       Safety Issues and Plan for Safety and Risk Management:   Patient denies current fears or concerns for personal safety.  Patient denies current or recent suicidal ideation or behaviors.  Patient denies current or recent homicidal ideation or behaviors.  Patient denies current or recent self injurious behavior or ideation.  Patient denies other safety concerns.  Recommended that patient call 911 or go to the local ED should there be a change in any of these risk factors.  Patient reports there are no firearms in the house.     Diagnostic Criteria:  Adjustment Disorder  A. The development of emotional or behavioral symptoms in response to an identifiable stressor(s) occurring within 3 months of the onset of the stressor(s)  B. These symptoms or behaviors are clinically significant, as evidenced by one or both of the following:       - Marked distress that is out of proportion to the severity/intensity of the stressor (with consideration for external context & culture)       - Significant impairment in social, occupational, or other important areas of functioning  C. The stress-related disturbance does not meet criteria for another disorder & is not not an exacerbation of another mental disorder  D. The symptoms do not represent normal  bereavement  E. Once the stressor or its consequences have terminated, the symptoms do not persist for more than an additional 6 months       * Adjustment Disorder with Depressed Mood: The predominant manifestations are symptoms such as low mood, tearfulness, or feelings of hopelessness    DSM5 Diagnoses: (Sustained by DSM5 Criteria Listed Above)  Diagnoses: Adjustment Disorders  309.0 (F43.21) With depressed mood  Psychosocial & Contextual Factors: 22-year-old  female, living with parents and younger sister, some college, no employment, hx pos for ADHD and depression      Intervention:   Brief Psychotherapy - discussed and prioritizing the most efficient treatment., Motivational Interviewing - helping to find the motivation to make positive behavior changes., and Person-Centered Therapy - Actualizes potential and moves towards increased awareness, spontaneity, trust in self and inner directedness.    Collateral Reports Completed:  TC Collateral: [facility] electronic medical records reviewed.    DATA:  Interactive Complexity: No  Crisis: No    PLAN: (Homework, other):  Provider will continue Diagnostic Assessment. Initial Treatment will focus on: Depressed Mood - identify triggers and appropriate interventions .  2.   Provider recommended the following referrals: N/A.    3.   Information in this assessment was obtained from the medical record and provided by patient who is a good historian.   4.   Follow up scheduled:  8.03.2023        Patient was given the following to do until next session:  nothing assigned  5.  Anticipated Discharge: Anticipated Discharge Time: < 1 Month     Procedure Code:  Psychotherapy (with patient) - 45 [CPT 58332]    LUCIE Mckenzie, [facility]    Suicide Risk and Safety Concerns were assessed for. Patient meets the following risk assessment and triage: Patient has no change in safety concerns. Committed to safety and agreed to follow previously developed safety plan.

## 2023-08-03 ENCOUNTER — TELEPHONE (OUTPATIENT)
Dept: BEHAVIORAL HEALTH | Facility: CLINIC | Age: 23
End: 2023-08-03
Payer: COMMERCIAL

## 2023-08-03 NOTE — TELEPHONE ENCOUNTER
Clinician unable to connect with pt for this session. Writer was experiencing technical difficulties and when able to log back in for the session, sent multiple links to pt and called twice. Pt did not respond and it seemed that the call was declined with no option to leave a voicemail. Clinician will attempt to contact pt again on the next business day (tomorrow, 8.04.2023).    Kely Reyes [facility]  8.03.2023  [phone]

## 2023-08-16 ENCOUNTER — TELEPHONE (OUTPATIENT)
Dept: BEHAVIORAL HEALTH | Facility: CLINIC | Age: 23
End: 2023-08-16
Payer: COMMERCIAL

## 2023-08-16 NOTE — TELEPHONE ENCOUNTER
Pt did not attend session scheduled with this writer on this day at 1400. Writer called phone listed in Epic; call was declined twice and did not go to voicemail. Unable to leave message. This is second no-show and provider will assume pt is declining services.     Kely Reyes, [facility]  8.16.2023  [phone]

## 2025-04-24 ENCOUNTER — HOSPITAL ENCOUNTER (EMERGENCY)
Facility: CLINIC | Age: 25
End: 2025-04-24
Attending: EMERGENCY MEDICINE
Payer: COMMERCIAL

## 2025-04-24 DIAGNOSIS — R09.02 HYPOXIA: ICD-10-CM

## 2025-04-24 DIAGNOSIS — E87.20 ACIDOSIS: ICD-10-CM

## 2025-04-24 DIAGNOSIS — R45.1 AGITATION REQUIRING SEDATION PROTOCOL: ICD-10-CM

## 2025-04-24 DIAGNOSIS — F10.929 ALCOHOLIC INTOXICATION WITH COMPLICATION: ICD-10-CM

## 2025-04-24 LAB
ALBUMIN SERPL BCG-MCNC: 4.1 G/DL (ref 3.5–5.2)
ALP SERPL-CCNC: 78 U/L (ref 40–150)
ALT SERPL W P-5'-P-CCNC: 10 U/L (ref 0–50)
ANION GAP SERPL CALCULATED.3IONS-SCNC: 20 MMOL/L (ref 7–15)
AST SERPL W P-5'-P-CCNC: 22 U/L (ref 0–45)
BASOPHILS # BLD AUTO: 0 10E3/UL (ref 0–0.2)
BASOPHILS NFR BLD AUTO: 0 %
BILIRUB SERPL-MCNC: 0.2 MG/DL
BUN SERPL-MCNC: 7.5 MG/DL (ref 6–20)
CALCIUM SERPL-MCNC: 8.4 MG/DL (ref 8.8–10.4)
CHLORIDE SERPL-SCNC: 105 MMOL/L (ref 98–107)
CREAT SERPL-MCNC: 0.61 MG/DL (ref 0.51–0.95)
EGFRCR SERPLBLD CKD-EPI 2021: >90 ML/MIN/1.73M2
EOSINOPHIL # BLD AUTO: 0.2 10E3/UL (ref 0–0.7)
EOSINOPHIL NFR BLD AUTO: 2 %
ERYTHROCYTE [DISTWIDTH] IN BLOOD BY AUTOMATED COUNT: 11.5 % (ref 10–15)
ETHANOL SERPL-MCNC: 0.36 G/DL
GLUCOSE BLDC GLUCOMTR-MCNC: 260 MG/DL (ref 70–99)
GLUCOSE BLDC GLUCOMTR-MCNC: 66 MG/DL (ref 70–99)
GLUCOSE SERPL-MCNC: 76 MG/DL (ref 70–99)
HCG SERPL QL: NEGATIVE
HCO3 SERPL-SCNC: 15 MMOL/L (ref 22–29)
HCT VFR BLD AUTO: 42.2 % (ref 35–47)
HGB BLD-MCNC: 14.5 G/DL (ref 11.7–15.7)
IMM GRANULOCYTES # BLD: 0 10E3/UL
IMM GRANULOCYTES NFR BLD: 0 %
LYMPHOCYTES # BLD AUTO: 1.4 10E3/UL (ref 0.8–5.3)
LYMPHOCYTES NFR BLD AUTO: 14 %
MCH RBC QN AUTO: 32.2 PG (ref 26.5–33)
MCHC RBC AUTO-ENTMCNC: 34.4 G/DL (ref 31.5–36.5)
MCV RBC AUTO: 94 FL (ref 78–100)
MONOCYTES # BLD AUTO: 0.5 10E3/UL (ref 0–1.3)
MONOCYTES NFR BLD AUTO: 5 %
NEUTROPHILS # BLD AUTO: 7.6 10E3/UL (ref 1.6–8.3)
NEUTROPHILS NFR BLD AUTO: 78 %
NRBC # BLD AUTO: 0 10E3/UL
NRBC BLD AUTO-RTO: 0 /100
PLATELET # BLD AUTO: 276 10E3/UL (ref 150–450)
POTASSIUM SERPL-SCNC: 3.4 MMOL/L (ref 3.4–5.3)
PROT SERPL-MCNC: 7.2 G/DL (ref 6.4–8.3)
RBC # BLD AUTO: 4.51 10E6/UL (ref 3.8–5.2)
SODIUM SERPL-SCNC: 140 MMOL/L (ref 135–145)
WBC # BLD AUTO: 9.8 10E3/UL (ref 4–11)

## 2025-04-24 PROCEDURE — 82310 ASSAY OF CALCIUM: CPT | Performed by: EMERGENCY MEDICINE

## 2025-04-24 PROCEDURE — 82962 GLUCOSE BLOOD TEST: CPT

## 2025-04-24 PROCEDURE — 36415 COLL VENOUS BLD VENIPUNCTURE: CPT | Performed by: EMERGENCY MEDICINE

## 2025-04-24 PROCEDURE — 258N000001 HC RX 258: Performed by: EMERGENCY MEDICINE

## 2025-04-24 PROCEDURE — 96361 HYDRATE IV INFUSION ADD-ON: CPT | Performed by: EMERGENCY MEDICINE

## 2025-04-24 PROCEDURE — 96374 THER/PROPH/DIAG INJ IV PUSH: CPT | Performed by: EMERGENCY MEDICINE

## 2025-04-24 PROCEDURE — 82077 ASSAY SPEC XCP UR&BREATH IA: CPT | Performed by: EMERGENCY MEDICINE

## 2025-04-24 PROCEDURE — 258N000003 HC RX IP 258 OP 636: Performed by: EMERGENCY MEDICINE

## 2025-04-24 PROCEDURE — 85025 COMPLETE CBC W/AUTO DIFF WBC: CPT | Performed by: EMERGENCY MEDICINE

## 2025-04-24 PROCEDURE — 99285 EMERGENCY DEPT VISIT HI MDM: CPT | Mod: 25 | Performed by: EMERGENCY MEDICINE

## 2025-04-24 PROCEDURE — 84703 CHORIONIC GONADOTROPIN ASSAY: CPT | Performed by: EMERGENCY MEDICINE

## 2025-04-24 PROCEDURE — 99284 EMERGENCY DEPT VISIT MOD MDM: CPT | Performed by: EMERGENCY MEDICINE

## 2025-04-24 RX ORDER — DEXTROSE MONOHYDRATE 25 G/50ML
25 INJECTION, SOLUTION INTRAVENOUS ONCE
Status: COMPLETED | OUTPATIENT
Start: 2025-04-24 | End: 2025-04-24

## 2025-04-24 RX ADMIN — DEXTROSE MONOHYDRATE 25 ML: 25 INJECTION, SOLUTION INTRAVENOUS at 20:02

## 2025-04-24 RX ADMIN — SODIUM CHLORIDE 1000 ML: 0.9 INJECTION, SOLUTION INTRAVENOUS at 21:03

## 2025-04-24 ASSESSMENT — ACTIVITIES OF DAILY LIVING (ADL)
ADLS_ACUITY_SCORE: 41

## 2025-04-25 VITALS
RESPIRATION RATE: 18 BRPM | DIASTOLIC BLOOD PRESSURE: 77 MMHG | SYSTOLIC BLOOD PRESSURE: 102 MMHG | OXYGEN SATURATION: 96 % | TEMPERATURE: 98.6 F | HEART RATE: 100 BPM

## 2025-04-25 VITALS
DIASTOLIC BLOOD PRESSURE: 72 MMHG | RESPIRATION RATE: 16 BRPM | SYSTOLIC BLOOD PRESSURE: 98 MMHG | TEMPERATURE: 98.6 F | HEART RATE: 100 BPM | OXYGEN SATURATION: 99 %

## 2025-04-25 LAB
AMPHETAMINES UR QL SCN: ABNORMAL
BARBITURATES UR QL SCN: ABNORMAL
BENZODIAZ UR QL SCN: ABNORMAL
BZE UR QL SCN: ABNORMAL
CANNABINOIDS UR QL SCN: ABNORMAL
FENTANYL UR QL: ABNORMAL
OPIATES UR QL SCN: ABNORMAL
PCP QUAL URINE (ROCHE): ABNORMAL

## 2025-04-25 PROCEDURE — 80307 DRUG TEST PRSMV CHEM ANLYZR: CPT | Performed by: EMERGENCY MEDICINE

## 2025-04-25 ASSESSMENT — ACTIVITIES OF DAILY LIVING (ADL)
ADLS_ACUITY_SCORE: 41

## 2025-04-25 NOTE — ED NOTES
Bed: UREDH-A  Expected date:   Expected time:   Means of arrival:   Comments:  [id number], 24F, on hold
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
## 2025-05-16 ENCOUNTER — HOSPITAL ENCOUNTER (INPATIENT)
Facility: CLINIC | Age: 25
End: 2025-05-16
Attending: EMERGENCY MEDICINE | Admitting: PSYCHIATRY & NEUROLOGY
Payer: COMMERCIAL

## 2025-05-16 DIAGNOSIS — F32.9 MAJOR DEPRESSIVE DISORDER, REMISSION STATUS UNSPECIFIED, UNSPECIFIED WHETHER RECURRENT: ICD-10-CM

## 2025-05-16 DIAGNOSIS — F10.121 ALCOHOL INTOXICATION DELIRIUM: ICD-10-CM

## 2025-05-16 DIAGNOSIS — F90.2 ATTENTION DEFICIT HYPERACTIVITY DISORDER (ADHD), COMBINED TYPE: ICD-10-CM

## 2025-05-16 DIAGNOSIS — F10.20 ALCOHOL DEPENDENCE, CONTINUOUS DRINKING BEHAVIOR (H): Primary | ICD-10-CM

## 2025-05-16 DIAGNOSIS — R45.851 SUICIDAL IDEATION: ICD-10-CM

## 2025-05-16 DIAGNOSIS — F33.2 SEVERE RECURRENT MAJOR DEPRESSION WITHOUT PSYCHOTIC FEATURES (H): ICD-10-CM

## 2025-05-16 LAB — ALCOHOL BREATH TEST: 0.29 (ref 0–0.01)

## 2025-05-16 PROCEDURE — 99285 EMERGENCY DEPT VISIT HI MDM: CPT | Performed by: EMERGENCY MEDICINE

## 2025-05-16 PROCEDURE — 82075 ASSAY OF BREATH ETHANOL: CPT | Performed by: EMERGENCY MEDICINE

## 2025-05-16 RX ORDER — NALTREXONE HYDROCHLORIDE 50 MG/1
1 TABLET, FILM COATED ORAL
Status: ON HOLD | COMMUNITY
Start: 2025-04-18

## 2025-05-16 ASSESSMENT — COLUMBIA-SUICIDE SEVERITY RATING SCALE - C-SSRS
2. HAVE YOU ACTUALLY HAD ANY THOUGHTS OF KILLING YOURSELF IN THE PAST MONTH?: YES
6. HAVE YOU EVER DONE ANYTHING, STARTED TO DO ANYTHING, OR PREPARED TO DO ANYTHING TO END YOUR LIFE?: NO
5. HAVE YOU STARTED TO WORK OUT OR WORKED OUT THE DETAILS OF HOW TO KILL YOURSELF? DO YOU INTEND TO CARRY OUT THIS PLAN?: NO
1. IN THE PAST MONTH, HAVE YOU WISHED YOU WERE DEAD OR WISHED YOU COULD GO TO SLEEP AND NOT WAKE UP?: YES
4. HAVE YOU HAD THESE THOUGHTS AND HAD SOME INTENTION OF ACTING ON THEM?: NO
3. HAVE YOU BEEN THINKING ABOUT HOW YOU MIGHT KILL YOURSELF?: YES

## 2025-05-16 ASSESSMENT — ACTIVITIES OF DAILY LIVING (ADL): ADLS_ACUITY_SCORE: 41

## 2025-05-17 ENCOUNTER — TELEPHONE (OUTPATIENT)
Dept: BEHAVIORAL HEALTH | Facility: CLINIC | Age: 25
End: 2025-05-17
Payer: COMMERCIAL

## 2025-05-17 PROBLEM — F33.2 SEVERE RECURRENT MAJOR DEPRESSION WITHOUT PSYCHOTIC FEATURES (H): Status: ACTIVE | Noted: 2023-07-19

## 2025-05-17 PROBLEM — F32.9 MAJOR DEPRESSIVE DISORDER, REMISSION STATUS UNSPECIFIED, UNSPECIFIED WHETHER RECURRENT: Status: ACTIVE | Noted: 2025-05-17

## 2025-05-17 PROBLEM — R45.851 SUICIDAL IDEATION: Status: ACTIVE | Noted: 2025-05-17

## 2025-05-17 PROBLEM — F10.20 ALCOHOL DEPENDENCE, CONTINUOUS DRINKING BEHAVIOR (H): Status: ACTIVE | Noted: 2025-05-17

## 2025-05-17 PROBLEM — F10.121 ALCOHOL INTOXICATION DELIRIUM: Status: ACTIVE | Noted: 2025-05-17

## 2025-05-17 PROBLEM — F33.1 MODERATE EPISODE OF RECURRENT MAJOR DEPRESSIVE DISORDER (H): Status: ACTIVE | Noted: 2023-07-19

## 2025-05-17 PROCEDURE — 128N000002 HC R&B CD/MH ADOLESCENT

## 2025-05-17 PROCEDURE — 99222 1ST HOSP IP/OBS MODERATE 55: CPT | Performed by: PSYCHIATRY & NEUROLOGY

## 2025-05-17 PROCEDURE — 250N000013 HC RX MED GY IP 250 OP 250 PS 637: Performed by: EMERGENCY MEDICINE

## 2025-05-17 RX ORDER — HYDROXYZINE HYDROCHLORIDE 25 MG/1
25 TABLET, FILM COATED ORAL EVERY 4 HOURS PRN
Status: DISCONTINUED | OUTPATIENT
Start: 2025-05-17 | End: 2025-05-17

## 2025-05-17 RX ORDER — OLANZAPINE 10 MG/2ML
10 INJECTION, POWDER, FOR SOLUTION INTRAMUSCULAR 3 TIMES DAILY PRN
Status: ACTIVE | OUTPATIENT
Start: 2025-05-17

## 2025-05-17 RX ORDER — ACETAMINOPHEN 325 MG/1
650 TABLET ORAL EVERY 4 HOURS PRN
Status: DISPENSED | OUTPATIENT
Start: 2025-05-17

## 2025-05-17 RX ORDER — HYDROXYZINE HYDROCHLORIDE 25 MG/1
25 TABLET, FILM COATED ORAL EVERY 4 HOURS PRN
Status: DISPENSED | OUTPATIENT
Start: 2025-05-17

## 2025-05-17 RX ORDER — OLANZAPINE 10 MG/1
10 TABLET, FILM COATED ORAL 3 TIMES DAILY PRN
Status: ACTIVE | OUTPATIENT
Start: 2025-05-17

## 2025-05-17 RX ORDER — MAGNESIUM HYDROXIDE/ALUMINUM HYDROXICE/SIMETHICONE 120; 1200; 1200 MG/30ML; MG/30ML; MG/30ML
30 SUSPENSION ORAL EVERY 4 HOURS PRN
Status: ACTIVE | OUTPATIENT
Start: 2025-05-17

## 2025-05-17 RX ORDER — DIAZEPAM 5 MG/1
5-20 TABLET ORAL EVERY 30 MIN PRN
Status: DISCONTINUED | OUTPATIENT
Start: 2025-05-17 | End: 2025-05-19

## 2025-05-17 RX ORDER — ONDANSETRON 4 MG/1
4 TABLET, ORALLY DISINTEGRATING ORAL
Status: ACTIVE | OUTPATIENT
Start: 2025-05-17

## 2025-05-17 RX ORDER — OLANZAPINE 10 MG/1
10 TABLET, ORALLY DISINTEGRATING ORAL
Status: COMPLETED | OUTPATIENT
Start: 2025-05-17 | End: 2025-05-17

## 2025-05-17 RX ORDER — OLANZAPINE 10 MG/2ML
10 INJECTION, POWDER, FOR SOLUTION INTRAMUSCULAR
Status: COMPLETED | OUTPATIENT
Start: 2025-05-17 | End: 2025-05-17

## 2025-05-17 RX ORDER — POLYETHYLENE GLYCOL 3350 17 G/17G
17 POWDER, FOR SOLUTION ORAL DAILY PRN
Status: ACTIVE | OUTPATIENT
Start: 2025-05-17

## 2025-05-17 RX ADMIN — OLANZAPINE 10 MG: 10 TABLET, ORALLY DISINTEGRATING ORAL at 06:58

## 2025-05-17 ASSESSMENT — ACTIVITIES OF DAILY LIVING (ADL)
LAUNDRY: WITH SUPERVISION
ADLS_ACUITY_SCORE: 41
ADLS_ACUITY_SCORE: 20
HYGIENE/GROOMING: HANDWASHING;INDEPENDENT
ADLS_ACUITY_SCORE: 41
ADLS_ACUITY_SCORE: 20
ORAL_HYGIENE: INDEPENDENT
ADLS_ACUITY_SCORE: 41
ADLS_ACUITY_SCORE: 41
ADLS_ACUITY_SCORE: 20
ADLS_ACUITY_SCORE: 41
ADLS_ACUITY_SCORE: 20
ADLS_ACUITY_SCORE: 41
ADLS_ACUITY_SCORE: 41
ADLS_ACUITY_SCORE: 20
DRESS: SCRUBS (BEHAVIORAL HEALTH);INDEPENDENT
ADLS_ACUITY_SCORE: 20

## 2025-05-17 NOTE — CONSULTS
Psychiatric consult acknowledged chart reviewed patient is accepted to inpatient mental health unit please reconsult if patient continue to stay in emergency room

## 2025-05-17 NOTE — ED PROVIDER NOTES
"ED Provider Note  [facility] EMERGENCY DEPARTMENT ([facility])    5/16/25       ED PROVIDER NOTE     History     Chief Complaint   Patient presents with    Alcohol Problem    Suicidal     Plan with no intent     HPI  Sara Campbell is a 24 year old female with a notable history of ADHD, OCD, MDD who presents to the ED for alcohol intoxication and psychiatric evaluation. The patient states \"I feel like I am going to die soon.\" Family states that the patient's mental health has been declining.  She has been drinking more and then has also been making statements about wishing she were dead.  No reported plan.  She has been on multiple medications a past for depression and things have not been improving, family feels that they have significantly worsened over the last couple weeks.  She does drink daily but is not able to specify how much she has been drinking.    Past Medical History  No past medical history on file.  No past surgical history on file.  naltrexone (DEPADE/REVIA) 50 MG tablet  sertraline (ZOLOFT) 100 MG tablet  VYVANSE 20 MG capsule      No Known Allergies  Family History  No family history on file.  Social History       A medically appropriate review of systems was performed with pertinent positives and negatives noted in the HPI, and all other systems negative.    Physical Exam   BP: (!) 134/91  Pulse: 91  Temp: 97.4  F (36.3  C)  Resp: 17  Height: 160 cm (5' 3\")  Weight: 45.1 kg (99 lb 8 oz)  SpO2: 99 %  Physical Exam  Constitutional:       General: She is not in acute distress.     Appearance: She is not toxic-appearing.   HENT:      Head: Normocephalic and atraumatic.      Nose: Nose normal.      Mouth/Throat:      Mouth: Mucous membranes are moist.      Pharynx: Oropharynx is clear.   Eyes:      Conjunctiva/sclera: Conjunctivae normal.      Pupils: Pupils are equal, round, and reactive to light.   Cardiovascular:      Rate and Rhythm: Normal " rate and regular rhythm.   Pulmonary:      Effort: Pulmonary effort is normal. No respiratory distress.      Breath sounds: No wheezing.   Musculoskeletal:         General: Normal range of motion.      Cervical back: Normal range of motion.   Skin:     General: Skin is warm and dry.   Neurological:      General: No focal deficit present.      Mental Status: She is alert and oriented to person, place, and time.   Psychiatric:         Mood and Affect: Affect is labile and tearful.         Speech: Speech is tangential.         Thought Content: Thought content includes suicidal ideation.           ED Course, Procedures, & Data      Procedures                Results for orders placed or performed during the hospital encounter of 05/16/25   Alcohol breath test POCT     Status: Abnormal   Result Value Ref Range    Alcohol Breath Test 0.294 (A) 0.00 - 0.01     Medications   hydrOXYzine HCl (ATARAX) tablet 25 mg (has no administration in time range)   ondansetron (ZOFRAN ODT) ODT tab 4 mg (has no administration in time range)     Labs Ordered and Resulted from Time of ED Arrival to Time of ED Departure   ALCOHOL BREATH TEST POCT - Abnormal       Result Value    Alcohol Breath Test 0.294 (*)    HCG QUALITATIVE URINE     No orders to display          Critical care was not performed.     Medical Decision Making  The patient's presentation was of high complexity (a chronic illness severe exacerbation, progression, or side effect of treatment).    The patient's evaluation involved:  an assessment requiring an independent historian (see separate area of note for details)  review of external note(s) from 3+ sources (ED note, clinic note, mental health note, telephone encounter)  review of 3+ test result(s) ordered prior to this encounter (see separate area of note for details)  ordering and/or review of 3+ test(s) in this encounter (see separate area of note for details)    The patient's management necessitated further care after  sign-out to Dr. Fairbanks (see their note for further management).    Assessment & Plan    This is a 24-year-old female with history of depression, ADHD presenting with main health concerns.  On arrival vitals were reassuring.  Patient was intoxicated here, presentation may be related to substance use but also underlying health disorder may be contributing as well.  A DEC assessment was requested.  Patient was signed out pending DEC recommendations.    I have reviewed the nursing notes. I have reviewed the findings, diagnosis, plan and need for follow up with the patient.    New Prescriptions    No medications on file       Final diagnoses:   Alcoholic intoxication without complication   Major depressive disorder, remission status unspecified, unspecified whether recurrent         LTAC, located within [facility] EMERGENCY DEPARTMENT  5/16/2025     Magan Cobb MD  05/17/25 0102

## 2025-05-17 NOTE — ED TRIAGE NOTES
"Pt states \"my eyes are flickering\". Pt reports etoh use, drinking \"a lot\" today. Pt was in residential treatment, discharged last month, pt's mom reports \"they put her on a lot of medications and she doesn't seem like herself\".         "

## 2025-05-17 NOTE — CONSULTS
"Diagnostic Evaluation Consultation  Crisis Assessment    Patient Name: Sara Campbell  Age:  24 year old  Legal Sex: female  Gender Identity: female  Pronouns: she/her/hers     Race: Black or   Ethnicity: Not  or   Language: English    Patient was assessed: Virtual: iPad   Crisis Assessment Start Date: 05/17/25  Crisis Assessment Start Time: 0516  Crisis Assessment Stop Time: 0548  Patient location: [facility] Emergency Department  ED12    Referral Data and Chief Complaint  Sara Campbell presents to the ED with family/friends. Patient is presenting to the ED for the following concerns: Intoxication, Depression. Factors that make the mental health crisis life threatening or complex are: Pt comes to the ED with her to get help because she was under the influence of alcohol. Pt states she is drinking daily, at least 300ML of vodka daily. Pt states she is in treatment at [facility], attending PHP. Pt states she has been there for 3 weeks. Pt doesn't feel the program is helping, \"I just sit in a room all day\". Pt states she does not have a desire to be sober. Pt endorses a wish to be dead, thoughts and intent. Pt further states her plan to die would be by carbon monoxide poison. Pt doesn't feel like her brain is working and is asking for help.  You are the professional, what are you getting paid for . Pt does not answer questions when asked about her mental health and repeats,  I don t know  or  you figure it out . Pt and mother do not feel pt has an accurate mental health diagnosis and is on too many medications currently, they hope pt can been stabilized and have her medications looked at and adjusted. Mom is hoping pt can take less medications as she feels this is really impacting her overall wellbeing negatively. Pt does not agree to IP MH placement currently, pt was informed and educated by writer about 72-hour hold that has been initiated by Dr" Magdi.    Informed Consent and Assessment Methods  Explained the crisis assessment process, including applicable information disclosures and limits to confidentiality, assessed understanding of the process, and obtained consent to proceed with the assessment.  Assessment methods included conducting a formal interview with patient, review of medical records, collaboration with medical staff, and obtaining relevant collateral information from family and community providers when available.  : done     History of the Crisis   Pt has history of ADHD, OCD, MDD, and anxiety. Pt reports hx with alcohol use disorder. Pt states she takes 6 different medications and doesn't feel they are working. Pt is not sure if there was any medication changes.Pt is currently attending PHP programming. She is not able to give more background on her mental health treatment hisstory.    Brief Psychosocial History  Family:  Single, Children no  Support System:  Parent(s), Sibling(s)  Employment Status:  unemployed  Source of Income:  none  Financial Environmental Concerns:  none  Current Hobbies:  arts/crafts, other (see comments), music, outdoor activities, television/movies/videos, puzzles, games ([employer], video games)  Barriers in Personal Life:  emotional concerns    Significant Clinical History  Current Anxiety Symptoms:  anxious, excessive worry, obsessions/compulsions, racing thoughts  Current Depression/Trauma:  low self esteem, crying or feels like crying, thoughts of death/suicide, excessive guilt, sadness, withdrawl/isolation, negativistic, helplessness, irritable, difficulty concentrating, hopelessness, sense of doom, impaired decision making  Current Somatic Symptoms:  racing thoughts, anxious  Current Psychosis/Thought Disturbance:  anger, hostile/aggressive, inattentive, forgetful, distractability, agitation, elated mood  Current Eating Symptoms:  loss of appetite  Chemical Use History:  Alcohol: Binge, Daily  Last Use:  05/16/25  Benzodiazepines: None  Opiates: None  Cocaine: None  Marijuana: None  Other Use: None  Withdrawal Symptoms:  (None endorsed)  Addictions:  (None endorsed)   Past diagnosis:  Anxiety Disorder, Depression, ADHD, Other, Substance Use Disorder (OCD)  Family history:  No known history of mental health or chemical health concerns  Past treatment:  Individual therapy, Psychiatric Medication Management, Partial Hospitalization  Details of most recent treatment:  Pt reports she is attending PHP, med management.  Other relevant history:  Worked at an [employer] and had to quit because of treatment. Hopes to return to this someday. Loved the job. Lives with mom currently.    Have there been any medication changes in the past two weeks:  yes, please comment  Not sure the changes, is on 6 medications.    Is the patient compliant with medications:  yes        Collateral Information  Is there collateral information: Yes     Collateral information name, relationship, phone number:  Sarah mother [phone]    What happened today: Pt was at a treatment in [facility] and since she came back, we don't know what is wrong with her. We don't know what is wrong with her. That is the frustration, they thought the PHP program would help but it didn't. She is hoping to find out what her true diagnosis is and what medications she should be on. Pt feels empty. She was a very happy child, I don't know what happened. Pt is very intelligent.     What is different about patient's functioning: Depressed for 10 years, went to college where drinking started.     Has patient made comments about wanting to kill themselves/others: yes    If d/c is recommended, can they take part in safety/aftercare planning:yes    Additional collateral information:  Pt lives with her mother, she is scared for the pt, who gets so angry.     Risk Assessment  [place] Suicide Severity Rating Scale Full Clinical Version:  Suicidal Ideation  Q1 Wish to be Dead  "(Lifetime): Yes  Q2 Non-Specific Active Suicidal Thoughts (Lifetime): Yes  3. Active Suicidal Ideation with any Methods (Not Plan) Without Intent to Act (Lifetime): Yes  4. Active Suicidal Ideation with Some Intent to Act, Without Specific Plan (Lifetime): Yes  5. Active Suicidal Ideation with Specific Plan and Intent (Lifetime): Yes  Q6 Suicide Behavior (Lifetime): no  Intensity of Ideation (Lifetime)  Most Severe Ideation Rating (Lifetime): 4  Frequency (Lifetime):  (Unable to answer)  Duration (Lifetime):  (Unable to answer)  Controllability (Lifetime):  (Unable to answer)  Deterrents (Lifetime):  (Unable to answer)  Reasons for Ideation (Lifetime):  (Unable to answer)  Suicidal Behavior (Lifetime)  Actual Attempt (Lifetime): No  Has subject engaged in non-suicidal self-injurious behavior? (Lifetime): Yes (Jose started cutting in High School. Stopped for some time, recently started again one week ago.)  Interrupted Attempts (Lifetime): No  Aborted or Self-Interrupted Attempt (Lifetime): No  Preparatory Acts or Behavior (Lifetime): No    [place] Suicide Severity Rating Scale Recent:   Suicidal Ideation (Recent)  Q1 Wished to be Dead (Past Month): yes  Q2 Suicidal Thoughts (Past Month): yes  Q3 Suicidal Thought Method: yes  Q4 Suicidal Intent without Specific Plan: yes  Q5 Suicide Intent with Specific Plan: yes  Level of Risk per Screen: high risk  Intensity of Ideation (Recent)  Most Severe Ideation Rating (Past 1 Month): 4  Description of Most Severe Ideation (Past 1 Month): 4 in past month  Frequency (Past 1 Month):  (\"I don't know\")  Duration (Past 1 Month):  (\"I don't know time\")  Controllability (Past 1 Month): Does not attempt to control thoughts  Deterrents (Past 1 Month): Deterrents definitely stopped you from attempting suicide  Reasons for Ideation (Past 1 Month): Does not apply  Suicidal Behavior (Recent)  Actual Attempt (Past 3 Months): No  Total Number of Actual Attempts (Past 3 Months): 0  Has " "subject engaged in non-suicidal self-injurious behavior? (Past 3 Months): Yes (Cut legs, started one week leg. Last cut a couple days ago.)  Interrupted Attempts (Past 3 Months): No  Total Number of Interrupted Attempts (Past 3 Months): 0  Aborted or Self-Interrupted Attempt (Past 3 Months): No  Total Number of Aborted or Self-Interrupted Attempts (Past 3 Months): 0  Preparatory Acts or Behavior (Past 3 Months): No  Total Number of Preparatory Acts (Past 3 Months): 0    Environmental or Psychosocial Events: ongoing abuse of substances, challenging interpersonal relationships, impulsivity/recklessness, helplessness/hopelessness  Protective Factors: Protective Factors: strong bond to family unit, community support, or employment, lives in a responsibly safe and stable environment, help seeking, cultural, spiritual , or Restoration beliefs associated with meaning and value in life    Does the patient have thoughts of harming others? Feels Like Hurting Others: no  Previous Attempt to Hurt Others: no  Current presentation: Irritable  Violence Threats in Past 6 Months: None endorsed  Current Violence Plan or Thoughts: None endorsed  Is the patient engaging in sexually inappropriate behavior?: no  Duty to warn initiated: no  Duty to warn details: None endorsed  Does Patient have a known history of aggressive behavior: Yes  Where/who has aggression been against (people, property, self, etc): Pt states she was told she was aggressive 5 hours ago, when she was told she can't leave.  When was the last episode of aggression: In this ED visit.  Where has the violence occurred (home, community, school): ED visit  Trigger to aggression (if known): Alcohol use.  Has aggression occurred as a result of MH concerns/diagnosis: \"I don't know\"  Does patient have history of aggression in hospital: \"I don't know\"    Is the patient engaging in sexually inappropriate behavior?  no        Mental Status Exam   Affect: Blunted  Appearance: " Disheveled  Attention Span/Concentration: Inattentive  Eye Contact: Intense    Fund of Knowledge: Delayed   Language /Speech Content: Fluent  Language /Speech Volume: Loud  Language /Speech Rate/Productions: Minimally Responsive  Recent Memory: Variable  Remote Memory: Variable  Mood: Irritable, Angry  Orientation to Person: Yes   Orientation to Place: Yes  Orientation to Time of Day: Yes  Orientation to Date: Yes     Situation (Do they understand why they are here?): Yes  Psychomotor Behavior: Agitated  Thought Content: Suicidal  Thought Form: Obsessive/Perseverative (Pt kept stating she needed help without providing detail.)      Medication  Psychotropic medications:   Medication Orders - Psychiatric (From admission, onward)      Start     Dose/Rate Route Frequency Ordered Stop    05/17/25 0054  hydrOXYzine HCl (ATARAX) tablet 25 mg         25 mg Oral EVERY 4 HOURS PRN 05/17/25 0055          Current Care Team  Patient Care Team:  RALPH Boo MD as PCP - General (Family Medicine)    Diagnosis  Patient Active Problem List   Diagnosis Code    Attention deficit hyperactivity disorder (ADHD), combined type F90.2    Moderate episode of recurrent major depressive disorder (H) F33.1     Primary Problem This Admission  Active Hospital Problems  F33.1  Moderate episode of recurrent major depressive disorder (H)    Clinical Summary and Substantiation of Recommendations   Clinical Substantiation:  It is the recommendation of this clinician that pt admit to [facility] for safety and stabilization. Pt displays the following risk factors that support IP admission: Pt reports she has thoughts to end her life via carbon monoxide poisoning. Pt states she drinks because she doesn't know how to feel or deal with her mental health. Pt is asking for help. Pt is unable to engage in safety planning to mitigate risk level in a non-secure setting. Lower levels of care have not been effective in mitigating risk. Due to this IP is  the least restrictive option of care for pt. Pt should remain in IP until deemed safe to return to the community and engage in OP MH supports.    Goals for crisis stabilization:  Pt and mother do not feel pt has an accurate mental health diagnosis and is on too many medications currently, they hope pt can been stabilized and have her medications looked at and adjusted. Mom is hoping pt can take less medications as she feels this is really impacting her overall wellbeing negatively.    Next steps for Care Team:  Pt's mother is with pt in her room currently. If mother leaves pt has been trying to leave ED, it might be helpful to have 1:1 for this pt as she is on a 72 hour hold per Dr Fairbanks. Pt becomes agitated easily, talking in a supportive yet direct manner seems to help pt redirect.    Treatment Objectives Addressed:  rapport building, assessing safety, safety planning    Therapeutic Interventions:  Identified and practiced coping skills., Coached on coping techniques/relaxation skills to help improve distress tolerance and managing intense emotions., Engaged in safety planning    Has a specific means been identified for suicidal/homicide actions: Yes    If yes, describe:  Die by carbon monoxide    Explain action steps toward mitigation:  Talked to pt in attempt to safety spencer, pt unwilling to participate currently.    Document completion of mitigation actions:  Attempted to safety plan, pt refuses    The follow up action still needed prior to discharge:  Pt will need to develop a safety plan and stabilize further prior to discharge.    Patient coping skills attempted to reduce the crisis:  Playing guitar, video games, talking to friends.    Disposition  Recommended referrals: Individual Therapy, Medication Management, MIN Comprehensive Assessment        Reviewed case and recommendations with attending provider. Attending Name: Dr Fairbanks       Attending concurs with disposition: yes       Patient and/or validated  legal guardian concurs with disposition: yes       Final disposition:  inpatient mental health       Imminent risk of harm: Suicidal Behavior  Severe psychiatric, behavioral or other comorbid conditions are appropriate for management at inpatient mental health as indicated by at least one of the following: Psychiatric Symptoms, Comorbid substance use disorder, Impaired impulse control, judgement, or insight  Severe dysfunction in daily living is present as indicated by at least one of the following: Incapacitation because of grave disability, Extreme deterioration in social interactions, Complete inability to maintain any appropriate aspect of personal responsibility in any adult roles  Situation and expectations are appropriate for inpatient care: Patient management/treatment at lower level of care is not feasible or is inappropriate  Inpatient mental health services are necessary to meet patient needs and at least one of the following: Specific condition related to admission diagnosis is present and judged likely to further improve at proposed level of care    Legal status: 72 Hour Hold                         72 Hour Hold - Date/Time Initiated: 05/17/2025 06:04AM                         72 Hour Hold - Date/Time Ends: 05/21/2025 6:04AM                                                                           Reviewed court records: yes     Assessment Details   Total duration spent with the patient: 32 min     CPT code(s) utilized: 37358 - Psychotherapy for Crisis - 60 (30-74*) min    [facility], Psychotherapist  DEC - Triage & Transition Services  Callback: [phone]

## 2025-05-17 NOTE — ED NOTES
IP MH Referral Acuity Rating Score (RARS)    LMHP complete at referral to IP MH, with DEC; and, daily while awaiting IP MH placement. Call score to PPS.  CRITERIA SCORING   New 72 HH and Involuntary for IP MH (not adolescent) 3/3   Boarding over 24 hours 0/1   Vulnerable adult at least 55+ with multiple co morbidities; or, Patient age 11 or under 0/1   Suicide ideation without relief of precipitating factors 1/1   Current plan for suicide 1/1   Current plan for homicide 0/1   Imminent risk or actual attempt to seriously harm another without relief of factors precipitating the attempt 1/1   Severe dysfunction in daily living (ex: complete neglect for self care, extreme disruption in vegetative function, extreme deterioration in social interactions) 1/1   Recent (last 2 weeks) or current physical aggression in the ED 0/1   Restraints or seclusion episode in ED 0/1   Verbal aggression, agitation, yelling, etc., while in the ED 1/1   Active psychosis with psychomotor agitation or catatonia 0/1   Need for constant or near constant redirection (from leaving, from others, etc).  1/1   Intrusive or disruptive behaviors 0/1   TOTAL 9

## 2025-05-17 NOTE — ED NOTES
Emergency Department I-PASS Sign-out      Illness Severity: Stable    Patient Summary:  24 year old female with pertinent PMH of depression, ADHD, alcohol abuse who presented with SI, intoxication    ED Course/treatment plan: DEC assessment was ordered and pending    Clinical Impression:  (F10.920) Alcoholic intoxication without complication    (F32.9) Major depressive disorder, remission status unspecified, unspecified whether recurrent      Edited by: Magan Cobb MD at 2025 0055    Action List:  Tests to Follow-up:  UDS, hCG    Medications Reconciled/Ordered:  No    ED Mental Health Boarding Order Set Used for Diet/PRNs/Other:  Yes    DEC, Extended Care, Psych Consult Orders:  DEC assessment ordered and pending.     Situational Awareness & Contingency Plannin Hour Hold Status:  Voluntary, would reassess if wanting to leave.  Active Orders  N/A    Psychiatric Emergency:  A psychiatric emergency is not active    Disposition:  Awaiting Behavioral Health DEC assessment      Edited by: Magan Cobb MD at 20259    Synthesis & Events after sign-out:  Behavioral Health DEC assessment completed with the  recommending admission to inpatient mental health with 72-hour hold. See separate DEC note from today's date for details on the assessment.     Patient also assessed by this provider.  Patient is very emotionally labile, does endorse continued suicidal ideation.  Patient expresses desire to discharge home but does not contract for safety.  Patient reports already being in ED treatment program, has had residential treatment recently as well.  Given patient has failed intensive outpatient treatment programs, is having suicidal thoughts, and cannot contract for safety, 72-hour hold for inpatient admission is indicated.  72-hour hold placed, mental health admission requested.          Hans Fairbanks MD   Emergency Medicine     Hans Fairbanks MD  [phone]

## 2025-05-17 NOTE — ED PROVIDER NOTES
Emergency Department I-PASS Sign-out      Illness Severity: Stable    Patient Summary:  24 year old female with pertinent PMH of depression, ADHD, alcohol abuse who presented with SI, intoxication    ED Course/treatment plan: DEC assessment was ordered and recommended admission with 72h hold    Clinical Impression:  (F10.920) Alcoholic intoxication without complication    (F32.9) Major depressive disorder, remission status unspecified, unspecified whether recurrent      Edited by: Hans Fairbanks MD at 2025 [id number]    Action List:  Tests to Follow-up:  UDS, hCG    Medications Reconciled/Ordered:  No, patient unsure of what current medications are. IP med rec ordered    ED Mental Health Boarding Order Set Used for Diet/PRNs/Other:  Yes    DEC, Extended Care, Psych Consult Orders:  Ext care and psychiatry consults ordered    Situational Awareness & Contingency Plannin Hour Hold Status:  On hold as of 2025    Psychiatric Emergency:  A psychiatric emergency is not active    Disposition:  Admit to   Edited by: Hans Fairbanks MD at 2025 [id number]    S[facility] Events after sign-out:  Pending mental health admission    Shanna Cortez MD   Emergency Medicine     Shanna Cortez MD  [phone]

## 2025-05-17 NOTE — PROGRESS NOTES
No phone or wallet upon admission    In locker:  Chapstick, red tshirt, navy crewneck sweatshirt, plaid pants, underwear, flipflops, stuffed animal        2:32 PM  Mom brings in pair of green pants, long skirt, cream long sleeve shirt, three t-shirts, four pair under wear, three pairs socks and one under-wire bra.                           Admission:  I am responsible for any personal items that are not sent to the safe or pharmacy.  [facility] is not responsible for loss, theft or damage of any property in my possession.    Signature:  _________________________________ Date: _______  Time: _____                                              Staff Signature:  ____________________________ Date: ________  Time: _____      2nd Staff person, if patient is unable/unwilling to sign:    Signature: ________________________________ Date: ________  Time: _____     Discharge:  [facility] has returned all of my personal belongings:    Signature: _________________________________ Date: ________  Time: _____                                          Staff Signature:  ____________________________ Date: ________  Time: _____

## 2025-05-17 NOTE — H&P
"      Reason for admission:     Patient was admitted due to suicidal ideation and depression in the context of alcohol use.        HPI:     24 year old female    Patient reports problems with depression dating back to early teen years. She had counseling/therapy as a teen. She was also prescribed stimulants for ADD in High School. She was also prescribed Strattera.    Patient has never had prior admission but has recently been participating in PHP program.    Patient has been on Vyvanse and Naltrexone as well as Seroquel 50 mg at bedtime for past 2 months.    Patient started abusing alcohol age 22 which has been her drug of choice. She denies other drug use but has used marijuana and mushrooms in the past.     Her drinking became problematic fairly quickly. Her use escalated to daily quickly. She first entered residential CD treatment 2 months ago and became sober. She was there until 1 month ago and then started PHP. She relapsed after a couple of days and escalated to daily drinking. She has been drinking 300 ml of vodka per day. Her last drink was yesterday.     Patient has had vague suicidal thoughts over the past year. She reports that the thoughts started worsening over the past month.     Patient presented to ER due to alcohol use. However she reported suicidal ideation in the ER.    Today the patient is cooperative to interview. She reports some depressed mood. She reports intermittent suicidal thoughts but no intent to harm self on unit.     Patient denies prior manic episode.        Patient has no withdrawal symptoms so far.      According to ER report:  HPI  Sara Campbell is a 24 year old female with a notable history of ADHD, OCD, MDD who presents to the ED for alcohol intoxication and psychiatric evaluation. The patient states \"I feel like I am going to die soon.\" Family states that the patient's mental health has been declining.  She has been drinking more and then has also been making statements " "about wishing she were dead.  No reported plan.  She has been on multiple medications a past for depression and things have not been improving, family feels that they have significantly worsened over the last couple weeks.  She does drink daily but is not able to specify how much she has been drinking.  Magan Cobb MD  05/17/25 0102           According to DEC assessment done in ER:     Referral Data and Chief Complaint  Sara Campbell presents to the ED with family/friends. Patient is presenting to the ED for the following concerns: Intoxication, Depression. Factors that make the mental health crisis life threatening or complex are: Pt comes to the ED with her to get help because she was under the influence of alcohol. Pt states she is drinking daily, at least 300ML of vodka daily. Pt states she is in treatment at [facility], attending PHP. Pt states she has been there for 3 weeks. Pt doesn't feel the program is helping, \"I just sit in a room all day\". Pt states she does not have a desire to be sober. Pt endorses a wish to be dead, thoughts and intent. Pt further states her plan to die would be by carbon monoxide poison. Pt doesn't feel like her brain is working and is asking for help.  You are the professional, what are you getting paid for . Pt does not answer questions when asked about her mental health and repeats,  I don t know  or  you figure it out . Pt and mother do not feel pt has an accurate mental health diagnosis and is on too many medications currently, they hope pt can been stabilized and have her medications looked at and adjusted. Mom is hoping pt can take less medications as she feels this is really impacting her overall wellbeing negatively. Pt does not agree to IP MH placement currently, pt was informed and educated by writer about 72-hour hold that has been initiated by Dr Fairbanks.     Informed Consent and Assessment Methods  Explained the crisis assessment process, " including applicable information disclosures and limits to confidentiality, assessed understanding of the process, and obtained consent to proceed with the assessment.  Assessment methods included conducting a formal interview with patient, review of medical records, collaboration with medical staff, and obtaining relevant collateral information from family and community providers when available.  : done        History of the Crisis   Pt has history of ADHD, OCD, MDD, and anxiety. Pt reports hx with alcohol use disorder. Pt states she takes 6 different medications and doesn't feel they are working. Pt is not sure if there was any medication changes.Pt is currently attending PHP programming. She is not able to give more background on her mental health treatment hisstory.     Brief Psychosocial History  Family:  Single, Children no  Support System:  Parent(s), Sibling(s)  Employment Status:  unemployed  Source of Income:  none  Financial Environmental Concerns:  none  Current Hobbies:  arts/crafts, other (see comments), music, outdoor activities, television/movies/videos, puzzles, games (ePARitar, video games)  Barriers in Personal Life:  emotional concerns     Significant Clinical History  Current Anxiety Symptoms:  anxious, excessive worry, obsessions/compulsions, racing thoughts  Current Depression/Trauma:  low self esteem, crying or feels like crying, thoughts of death/suicide, excessive guilt, sadness, withdrawl/isolation, negativistic, helplessness, irritable, difficulty concentrating, hopelessness, sense of doom, impaired decision making  Current Somatic Symptoms:  racing thoughts, anxious  Current Psychosis/Thought Disturbance:  anger, hostile/aggressive, inattentive, forgetful, distractability, agitation, elated mood  Current Eating Symptoms:  loss of appetite  Chemical Use History:  Alcohol: Binge, Daily  Last Use: 05/16/25  Benzodiazepines: None  Opiates: None  Cocaine: None  Marijuana: None  Other Use:  None  Withdrawal Symptoms:  (None endorsed)  Addictions:  (None endorsed)   Past diagnosis:  Anxiety Disorder, Depression, ADHD, Other, Substance Use Disorder (OCD)  Family history:  No known history of mental health or chemical health concerns  Past treatment:  Individual therapy, Psychiatric Medication Management, Partial Hospitalization  Details of most recent treatment:  Pt reports she is attending PHP, med management.  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 Hopes to return to this someday. Loved the job. Lives with mom currently.     Have there been any medication changes in the past two weeks:  yes, please comment  Not sure the changes, is on 6 medications.     Is the patient compliant with medications:  yes        Collateral Information  Is there collateral information: Yes      Collateral information name, relationship, phone number:  Sarah mother [phone]     What happened today: Pt was at a treatment in [facility] and since she came back, we don't know what is wrong with her. We don't know what is wrong with her. That is the frustration, they thought the PHP program would help but it didn't. She is hoping to find out what her true diagnosis is and what medications she should be on. Pt feels empty. She was a very happy child, I don't know what happened. Pt is very intelligent.      What is different about patient's functioning: Depressed for 10 years, went to college where drinking started.      Has patient made comments about wanting to kill themselves/others: yes    Valerie Billingsley [facility], Psychotherapist  DEC - Triage & Transition Services            Past Medical History:     PAST MEDICAL HISTORY: No past medical history on file.    PAST SURGICAL HISTORY: No past surgical history on file.          Family History:     FAMILY HISTORY: No family history on file.        Social History:     Please see the full psychosocial profile from the clinical  "treatment coordinator.   SOCIAL HISTORY:   Social History     Tobacco Use    Smoking status: Not on file    Smokeless tobacco: Not on file   Substance Use Topics    Alcohol use: Not on file     Patient lives with her parents. She has no current romantic involvement. She is unemployed. She completed one semester of college. She has no children and no current romantic involvement.         PTA Medications:     Medications Prior to Admission   Medication Sig Dispense Refill Last Dose/Taking    naltrexone (DEPADE/REVIA) 50 MG tablet Take 1 tablet by mouth daily at 2 pm.   Unknown    lisdexamfetamine (VYVANSE) 60 MG capsule Take 60 mg by mouth every morning.   Unknown            Current Medications:     Current Facility-Administered Medications   Medication Dose Route Frequency Provider Last Rate Last Admin     Current Facility-Administered Medications   Medication Dose Route Frequency Provider Last Rate Last Admin    hydrOXYzine HCl (ATARAX) tablet 25 mg  25 mg Oral Q4H PRN Magan Cobb MD        ondansetron (ZOFRAN ODT) ODT tab 4 mg  4 mg Oral Once PRN Magan Cobb MD                Allergies:     No Known Allergies         Labs:     Recent Results (from the past 72 hours)   Alcohol breath test POCT    Collection Time: 05/16/25 11:08 PM   Result Value Ref Range    Alcohol Breath Test 0.294 (A) 0.00 - 0.01            Physical Exam:     /60   Pulse 73   Temp 98.5  F (36.9  C) (Oral)   Resp 18   Ht 1.6 m (5' 3\")   Wt 45.1 kg (99 lb 8 oz)   SpO2 100%   BMI 17.63 kg/m    Weight is 99 lbs 8 oz  Body mass index is 17.63 kg/m .    Physical Exam:  Gen: No acute distress  Skin: No diaphoresis or rash  Neuro: No abnormal movements         Physical ROS:     The remainder of 10-point review of systems was negative except as noted in HPI.             Mental Status Exam:     Mental Status  Patient is casually dressed  Hygiene good  Speech fluent  Thought Process concrete  Thought Content:  Intermittent " suicidal ideation,    No homicidal ideation,   No ideas of reference,    No loose associations,    No auditory hallucinations,     No visual hallucinations   No delusions  Psychomotor: No agitation or slowing  Cognition:  Alert and oriented to time place and person  Attention good  Concentration good  Memory normal including recent and remote memory  Mood:  depression  Affect: mood congruent  Judgement: limited  Eye contact good  Cooperation good  Language normal  Fund of knowledge normal  Musculoskeletal normal gait with no abnormal movements         Diagnoses:     Severe recurrent major depression without psychotic features (H)    Patient Active Problem List   Diagnosis    Attention deficit hyperactivity disorder (ADHD), combined type    Severe recurrent major depression without psychotic features (H)    Suicidal ideation    Alcohol intoxication delirium    Major depressive disorder, remission status unspecified, unspecified whether recurrent    Alcohol dependence, continuous drinking behavior (H)              Assessment:     Patient presents with depression and suicidal thoughts in the context of alcohol use         Plan:     Legal:  72 hour hold    Medication:  Will watch for withdrawal and use Valium detox protocols. Assess options for depression treatment as detox proceeds.    Consults: Hospitalist will be consulted if medical issues arise      Multidisciplinary Interventions:  to gather collateral information, coordinate care with outpatient providers and begin follow up planning      Disposition: outpatient CD treatment        More than 60 minutes spent on this visit with more than 50% time spent on coordination of care with staff, reviewing medical record, psychoeducation, providing supportive therapy regarding coping with chronic mental illness, entering orders and preparing documentation for the visit    Candido Ventura MD

## 2025-05-17 NOTE — ED NOTES
"Pt yelling at staff and at mom after receiving 72 hour hold paperwork. Pt yelling at mom \"I hate you\" and \"why are you doing this again\". ROXY team at bedside. Pt yelling at staff \"I have no rights\", writer at bedside, pt educated about her pt rights and offered a physical copy, pt denied physical copy of her rights. Pt offered oral medication, continued with tangential speech. Writer continued to offer pt oral medication. Pt voluntarily took oral medication. Water provided to pt per request. RR WNL, even, and unlabored. 1:1 and mom continue to be at bedside. Pt expressed no other needs at this time.    "

## 2025-05-17 NOTE — PHARMACY-ADMISSION MEDICATION HISTORY
Pharmacist Admission Medication History    Admission medication history is complete. The information provided in this note is only as accurate as the sources available at the time of the update.    Information Source(s): Clinic records, Hospital records, [facility]    Pertinent Information:   Patient unable to be interviewed; consult requested by provider since patient unable to confirm current medications   Patient recently had telemedicine visit with family medicine provider on 5/13 which confirmed naltrexone 50 mg daily; per that note patient also endorsed taking Vyvanse 40 mg daily - the last time Vyvanse was filled was in February for 30 day supply and it was only 60 mg     Changes made to PTA medication list:  Added: None  Deleted: sertraline (old mediction)   Changed: Vynanse per prescription data     Allergies reviewed with patient and updates made in EHR: unable to assess    Medication History Completed By: AMEE GURROLA [facility] 5/17/2025 8:42 AM    Prior to Admission medications    Medication Sig Last Dose Taking? Auth Provider Long Term End Date   naltrexone (DEPADE/REVIA) 50 MG tablet Take 1 tablet by mouth daily at 2 pm. Unknown Yes Reported, Patient Yes    lisdexamfetamine (VYVANSE) 60 MG capsule Take 60 mg by mouth every morning. Unknown  Reported, Patient

## 2025-05-17 NOTE — PLAN OF CARE
INITIAL PSYCHOSOCIAL ASSESSMENT AND NOTE    Information for assessment was obtained from:       [x]Patient     []Parent     []Community provider    [x]Hospital records   []Other     []Guardian       Presenting Problem:  Patient is a 24 year old female who uses she/her. Patient was admitted to [facility] on 5/16/2025 Station 6AE on a 72 hour hold placed on 5/17/25 at 0604.    Presenting issues and presentation for admit: Patient presented to the ED for depression and alcohol intoxication. Family states that the patient's mental health has been declining. She has been drinking more and then has also been making statements about wishing she were dead. No reported plan. She has been on multiple medications in the past for depression and things have not been improving. Her family feels that she has significantly worsened over the last couple weeks. Pt states she is drinking daily, at least 300ML of vodka daily.        The following areas have been assessed:    History of Mental Health and Chemical Dependency:  Mental Health History:  Patient has a historical diagnosis of ADHD, OCD, MDD and anxiety .   The patient has not a history of suicide attempts .   Patient  has a history of engaged in non-suicidal self-injury via cutting.     Previous psychiatric hospitalizations and treatments (including outpatient, residential, and inpatient care:  Patient has been attending PHP program with [facility] for the past three weeks. She was in their residential program prior to [facility].    Substance Use History  Patient reports that she is an alcoholic. She has not had treatment for it.      Patient's current relationship status is   single.   Patient reported having zero child(nakul).       Family Description (Constellation, significant information and events, Family Psychiatric History):   Patient was raised by both parents with a younger sister. She reports that her dad is an  alcoholic.    Significant Medical issues, Life events or Trauma history:   Patient reports she was bullied in school.      Living Situation:  Patient's current living/housing situation is staying in own home/apartment. They live with parent and sister and they report that housing is stable and they are able to return upon discharge.       Educational Background:    Patient's highest education level was some college. Patient reports they are  able to understand written materials.     Occupational and Financial Status:     Patient is currently unemployed.  Patient reports  income is obtained through parents.  Patient does not identify finances as a current stressor. They are insured under [employer] [employer] [facility]. 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  Contextual influences on patient's health include severity of symptoms and level of functioning.   Patient identified their preferred language to be English. Patient reported they do not need the assistance of an .  Spiritual considerations include: None    Social Functioning (organizations, interests, support system):   In their free time, patient reports they like to play guitar, video games and spending time with friends.      Patient identified parents, siblings, and cousin as part of their support system.  Patient identified the quality of these relationships as good.       Current Treatment Providers are:  Primary Care Provider:  Name/Clinic: unknown   Number:     Medication Management/Psychiatry:  Name/Clinic: unknown   Number:  "    Therapist:   Name/Clinic: Lorraine Coffey with [facility]  Number:       Other contact information (family, friends, SO) and JOSHUA status: Sarah, mother [phone]       GOALS FOR HOSPITALIZATION:  What do patient want to accomplish during this hospitalization to make things better for the patient.?   Patient priorities:  The patient reported that what is most important to them is getting an accurate diagnosis and medication adjustment. They identified discharge as a goal of this hospitalization.    Social Service Assessment/Plan:  Patient view:   Patient reports it is important for the care team to know that pinkie (stuffed animal) is important to her.  Upon discharge, they anticipate needing \"don't know\" set up for them.      Strengths and Assets:  The patient uses these coping skills to help with stress and hard times: TIPP skills, ABC Please skill.          Patient will have psychiatric assessment and medication management by the psychiatrist. Medications will be reviewed and adjusted per DO/MD/APRN CNP as indicated. The treatment team will continue to assess and stabilize the patient's mental health symptoms with the use of medications and therapeutic programming. Hospital staff will provide a safe environment and a therapeutic milieu. Staff will continue to assess patient as needed. Patient will participate in unit groups and activities. Patient will receive individual and group support on the unit.      CTC will do individual inpatient treatment planning and after care planning. CTC will discuss options for increasing community supports with the patient. CTC will coordinate with outpatient providers and will place referrals to ensure appropriate follow up care is in place.                                   "

## 2025-05-17 NOTE — PLAN OF CARE
Sara Briggs Mutuma  May 17, 2025  Plan of Care Hand-off Note     Patient Recommended Care Path: inpatient mental health    Clinical Substantiation:  It is the recommendation of this clinician that pt admit to IP MH for safety and stabilization. Pt displays the following risk factors that support IP admission: Pt reports she has thoughts to end her life via carbon monoxide poisoning. Pt states she drinks because she doesn't know how to feel or deal with her mental health. Pt is asking for help. Pt is unable to engage in safety planning to mitigate risk level in a non-secure setting. Lower levels of care have not been effective in mitigating risk. Due to this IP is the least restrictive option of care for pt. Pt should remain in IP until deemed safe to return to the community and engage in OP MH supports.    Goals for crisis stabilization:  Pt and mother do not feel pt has an accurate mental health diagnosis and is on too many medications currently, they hope pt can been stabilized and have her medications looked at and adjusted. Mom is hoping pt can take less medications as she feels this is really impacting her overall wellbeing negatively.    Next steps for Care Team:  Pt's mother is with pt in her room currently. If mother leaves pt has been trying to leave ED, it might be helpful to have 1:1 for this pt as she is on a 72 hour hold per Dr Fairbanks. Pt becomes agitated easily, talking in a supportive yet direct manner seems to help pt redirect.    Treatment Objectives Addressed:  rapport building, assessing safety, safety planning    Therapeutic Interventions:  Identified and practiced coping skills., Coached on coping techniques/relaxation skills to help improve distress tolerance and managing intense emotions., Engaged in safety planning    Has a specific means been identified for suicidal.homicide actions: Yes  If yes, describe: Die by carbon monoxide  Explain action steps toward mitigation: Talked to pt in  attempt to safety spencer, pt unwilling to participate currently.  Document completion of mitigation action: Attempted to safety plan, pt refuses  The follow up action still needed prior to discharge: Pt will need to develop a safety plan and stabilize further prior to discharge.    Patient coping skills attempted to reduce the crisis:  Playing guitar, video games, talking to friends.       Imminent risk of harm: Suicidal Behavior  Severe psychiatric, behavioral or other comorbid conditions are appropriate for management at inpatient mental health as indicated by at least one of the following: Psychiatric Symptoms, Comorbid substance use disorder, Impaired impulse control, judgement, or insight  Severe dysfunction in daily living is present as indicated by at least one of the following: Incapacitation because of grave disability, Extreme deterioration in social interactions, Complete inability to maintain any appropriate aspect of personal responsibility in any adult roles  Situation and expectations are appropriate for inpatient care: Patient management/treatment at lower level of care is not feasible or is inappropriate  Inpatient mental health services are necessary to meet patient needs and at least one of the following: Specific condition related to admission diagnosis is present and judged likely to further improve at proposed level of care      Collateral contact information:  mother Smith [phone]    Legal Status: 72 Hour Hold                         72 Hour Hold - Date/Time Initiated: 05/17/2025 06:04AM                         72 Hour Hold - Date/Time Ends: 05/21/2025 6:04AM                                                                             Reviewed court records: yes     Psychiatry Consult: Patient has Psychiatry Consult Order    BIANKA Suresh

## 2025-05-17 NOTE — PLAN OF CARE
"Problem: Suicide Risk  Goal: Absence of Self-Harm  Outcome: Progressing   Goal Outcome Evaluation:    Plan of Care Reviewed With: patient      Pt presents as calm, withdrawn. She spends a lot of time in her room sleeping this evening. Pt declined to come out of her room for activities. Pt comes out of her room for snacks. RN brought pt dinner tray to her room. Pt declined a visit from a friend. When asked about mental health later in the shift, pt stated, \"I don't know.\" Pt denied suicidal thoughts at this time. Pt denied hallucinations. Pt did not want to discuss mental health further. No safety concerns this shift.     Pt MSSA 7 at 1600; HR mildly elevated, some notable tremor. Other vitals WDL. Pt denies withdrawals at this time. Pt stated they are not feeling anxious, \"I'm just tired.\"     Pt notified of order for UDS/HCG. Urine specimen cup left in pt bathroom. Still to be completed.     Pt educated on prns available. Pt denied need for medications at this time.          "

## 2025-05-17 NOTE — PROGRESS NOTES
"25 yo female (goes by Jennifer), arrived on Stn 6A on 72 hour hold at 1045a from [place] ED; post etoh intoxication, brought in last micheal. She reportedly has been drinking 300 ml vodka/day; JENNIFER last micheal .294. Pt is currently attending PHP, and reportedly has been in this out pt tx for 3 weeks. Per ED report, pt has dx of alcohol use disorder, ADHD, ODD, and MDD; no medical concerns (aside from etoh abuse). Pt has been on several medications, which pt feels are not helping. Intake report includes hx of cutting, starting again one week ago. Report also states pt had a plan to suicide by carbon monoxide. See intake report for additions to pt's story.    Was able to get some of admission profile completed. Pt declined though encouraged to complete the asmnt. She stated she was \"too tired.\" Encouraged pt to let this nurse know, should she change her mind; pt agreed. Pt was also very preoccupied with being able to have her stuffed animal in her rm, which was ok'd by provider. She is currently eating lunch in her rm, and said she plans to rest. Her affect is a bit anxious and sad, mood is \"numb, also worried.\" Pt denies current si, sib thoughts; agrees to report if these occur. She rates depression 8/10, anxiety 7. Pt also endorses paranoid thoughts, denies AH. She said she has had some issues w binge eating in the past; currently her appetite can be somewhat poor. She was observed eating well at lunch time. Pt's MSSA score was 6, scoring mainly for hr of 114. Pt was oriented to her rm and the unit, admission orders are placed by Dr Ventura.    Pt has remained napping this afternoon since she had her lunch. No behavioral or somatic concerns; no s/sx w/d.      "

## 2025-05-17 NOTE — TELEPHONE ENCOUNTER
S: [facility] Carli , DEC  Kareen calling at 6:04 AM about 24 year old/female presenting with concerns of worsening and SI w/ plan.      B: Pt arrived via Family. Presenting problem, stressors: pt came in for intoxication, depression, and SI with a plan of carbon monoxide poisoning.  She does not feel like she is right in the head (depression) and getting the help she needs. So she drinks to cope.     She is drinking 3 ml daily for the last week.  She was in treatment for alcohol, got out 3 weeks.  Tx was in [place].  Right now she is in a PHP program that she is not feeling is helpful at all.      Mom is scared for Pt due to her anger; she is scared for  her and is afraid of Pt hurting her.  Pt is impulsive.      Pt affect in ED: Dramatic, Irritable , and yelling outloud, not happy  Pt Dx: Major Depressive Disorder and ADHD  Previous IPMH hx? No  Pt endorses SI with a plan to kill self by monoxide poisoning   Hx of suicide attempt? No  Pt endorses SIB via cutting of the thigh, most recent episode a week ago.  Started in HS.  Pt denies HI   Pt denies hallucinations .   Pt RARS Score: 9    Hx of aggression/violence, sexual offenses, legal concerns, Epic care plan? describe: No  Current concerns for aggression this visit? No  Does pt have a history of Civil Commitment? No  Is Pt their own guardian? Yes    Pt is prescribed medication. Is patient medication compliant? Yes and she is also drinking.   Pt endorses OP services: PHP  CD concerns: Actively using/consuming no other drug use besides the alcohol. No hx of w/d seizures or Dts.  Acute or chronic medical concerns: No  Does Pt present with specific needs, assistive devices, or exclusionary criteria? None      Pt is ambulatory  Pt is able to perform ADLs independently      A: Pt to be reviewed for [facility] admission. Pt is on a 72HH, initiated 5/17/25 at 6:04AM.  Expires 5/21/25 at 11:59pm.  Preferred placement: Statewide    COVID Symptoms: No  If yes, COVID test  required   Utox: Ordered, not yet collected   CMP: Not ordered, intake requested lab  CBC: Not ordered, intake requested lab  HCG: Not ordered, intake to request lab     R: Patient cleared and ready for behavioral bed placement: Yes  Pt placed on IP worklist? Yes    Does Patient need a Transfer Center request created? No, Pt is located within [facility] ED, [facility] ED, or [facility]

## 2025-05-18 LAB
CHOLEST SERPL-MCNC: 214 MG/DL
EST. AVERAGE GLUCOSE BLD GHB EST-MCNC: 97 MG/DL
HBA1C MFR BLD: 5 %
HDLC SERPL-MCNC: 150 MG/DL
LDLC SERPL CALC-MCNC: 55 MG/DL
NONHDLC SERPL-MCNC: 64 MG/DL
TRIGL SERPL-MCNC: 43 MG/DL

## 2025-05-18 PROCEDURE — 80061 LIPID PANEL: CPT | Performed by: PSYCHIATRY & NEUROLOGY

## 2025-05-18 PROCEDURE — 128N000002 HC R&B CD/MH ADOLESCENT

## 2025-05-18 PROCEDURE — 97150 GROUP THERAPEUTIC PROCEDURES: CPT | Mod: GO

## 2025-05-18 PROCEDURE — 36415 COLL VENOUS BLD VENIPUNCTURE: CPT | Performed by: PSYCHIATRY & NEUROLOGY

## 2025-05-18 PROCEDURE — 83036 HEMOGLOBIN GLYCOSYLATED A1C: CPT | Performed by: PSYCHIATRY & NEUROLOGY

## 2025-05-18 ASSESSMENT — ACTIVITIES OF DAILY LIVING (ADL)
ADLS_ACUITY_SCORE: 20
DRESS: INDEPENDENT;SCRUBS (BEHAVIORAL HEALTH)
ADLS_ACUITY_SCORE: 20
HYGIENE/GROOMING: HANDWASHING;INDEPENDENT
ADLS_ACUITY_SCORE: 20
ORAL_HYGIENE: INDEPENDENT
ADLS_ACUITY_SCORE: 20

## 2025-05-18 NOTE — PLAN OF CARE
Problem: Alcohol Withdrawal  Goal: Alcohol Withdrawal Symptom Control  Outcome: Progressing   Goal Outcome Evaluation:       Scored 6 on MSSA at 0030.  Mild diaphoresis and hand tremor noted.  No c/o pain or discomfort.

## 2025-05-18 NOTE — PLAN OF CARE
"  Rehab Group    Start time: 11:15  End time: 11:55  Patient time total: 25 minutes    attended partial group    #6 attended   Group Type: occupational therapy   Group Topic Covered: coping skills, Physical activity, and social skills     Group Session Detail:  OT provided education regarding the therapeutic benefits of physical activity for overall mind-body wellness. OT provided verbal instruction and visual demonstration of various full body exercises/stretching emphasizing the potential benefits of active engagement, such as: reduction in muscle tension and/or pain, improvement in blood circulation, enhanced cardiovascular health, improved/stabilized mood, improved self-esteem and self-confidence, and promotion of overall well-being.      Patient Response/Contribution:  cooperative with task, listened actively, and actively engaged     Patient Detail:  Upon check-in, patient reported that \"serafin exercise\" is one activity she enjoys which promotes physical wellness in her life. Patient reported that a benefit of engaging in serafin exercise is \"being able to see myself improve.\" Patient was a quiet and reserved presence in group; but, an active participant. Patient was able to follow verbal instructions and visual demonstration of exercises successfully. Good attention sustained while present in group. Pleasant and cooperative group attendee. Patient opted to leave group early without reason; no return.       [id number] OT Group (2 or more in attendance)      Patient Active Problem List   Diagnosis    Attention deficit hyperactivity disorder (ADHD), combined type    Severe recurrent major depression without psychotic features (H)    Suicidal ideation    Alcohol intoxication delirium    Major depressive disorder, remission status unspecified, unspecified whether recurrent    Alcohol dependence, continuous drinking behavior (H)        "

## 2025-05-18 NOTE — PLAN OF CARE
Problem: Depressive Symptoms  Goal: Depressive Symptoms  Description: Signs and symptoms of listed problems will be absent or manageable.  Outcome: Progressing  Flowsheets (Taken 5/18/2025 1641)  Depressive Symptoms Present:   insight   affect   thought process     Problem: Depressive Symptoms  Intervention: Depressive Symptoms  Flowsheets (Taken 5/18/2025 1641)  Depression:   maintain safety precautions   maintain safe secure environment   encourage nutrition and hydration   encourage participation / independence with adls   establish therapeutic relationship   Goal Outcome Evaluation:       Patient is isolative, prefers to stay most of the time in her room, denies SI,SIB,hallucination, rated anxiety and depression 3/10, denies any pain or discomfort, contracted for safety in the unit.  MSSA score = 3  Patient ate 100% of dinner and snacks.  No behavioral incidents noted.  Will continue to monitor.

## 2025-05-18 NOTE — CARE PLAN
05/17/25 2220   Observation Type   Harm Category suicide/self-harm   Level of Special Monitoring none

## 2025-05-18 NOTE — PLAN OF CARE
Problem: Sleep Disturbance  Goal: Adequate Sleep/Rest  Outcome: Progressing   Goal Outcome Evaluation:       Pt appeared to have slept for 6.25 hours.  Respirations are even and unlabored while asleep.  No safety/behavioral/medical concerns this shift.  No prn administered.  Pt remain on SI/SIB precautions.

## 2025-05-18 NOTE — PLAN OF CARE
"Goal Outcome Evaluation:    Plan of Care Reviewed With: patient      Pt is calm and pleasant, no overt s/sx w/d. Her mood is \"kind of nervous,\" affect fairly full range. She rates depression 3/10, anxiety 4. Pt still goes off topic some, making several requests and inquiries during check in. She is redirectable back to topic. Pt states she will likely attend \"some\" grps if held, otherwise will keep to self, and stay mainly in her rm. She came out to the DR for bkfst, also had lab work done. Pt said she may nap a bit during the day, today. Her goal for the day is \"to eat and drink.\" MSSA score is 3. Pt's mom is to visit this afternoon.    Pt has been resting in her rm, today. Her mom visited this afternoon, and brought some clothes for her. Pt did attend music grp this morning. She talked about wanting to discharge soon-so she can get back to her out pt tx. Pt is also wanting to talk to her provider re getting back on some of her routine meds. No c/o or overt s/sx etoh w/d. Urine spec pending; was prompted several times to provide.                     "

## 2025-05-19 PROCEDURE — 128N000002 HC R&B CD/MH ADOLESCENT

## 2025-05-19 PROCEDURE — 99223 1ST HOSP IP/OBS HIGH 75: CPT | Mod: AI | Performed by: CLINICAL NURSE SPECIALIST

## 2025-05-19 PROCEDURE — 250N000013 HC RX MED GY IP 250 OP 250 PS 637: Performed by: CLINICAL NURSE SPECIALIST

## 2025-05-19 RX ORDER — LISDEXAMFETAMINE DIMESYLATE 20 MG/1
40 CAPSULE ORAL EVERY MORNING
Refills: 0 | Status: DISCONTINUED | OUTPATIENT
Start: 2025-05-20 | End: 2025-05-23

## 2025-05-19 RX ORDER — NALTREXONE HYDROCHLORIDE 50 MG/1
50 TABLET, FILM COATED ORAL DAILY
Status: DISPENSED | OUTPATIENT
Start: 2025-05-19

## 2025-05-19 RX ADMIN — NALTREXONE HYDROCHLORIDE 50 MG: 50 TABLET, FILM COATED ORAL at 14:14

## 2025-05-19 ASSESSMENT — ACTIVITIES OF DAILY LIVING (ADL)
ADLS_ACUITY_SCORE: 20

## 2025-05-19 NOTE — PLAN OF CARE
"Goal Outcome Evaluation:    Problem: Adult Inpatient Plan of Care  Goal: Plan of Care Review  Description: The Plan of Care Review/Shift note should be completed every shift.  The Outcome Evaluation is a brief statement about your assessment that the patient is improving, declining, or no change.  This information will be displayed automatically on your shiftnote.  Outcome: Progressing     Problem: Alcohol Withdrawal  Goal: Alcohol Withdrawal Symptom Control  Outcome: Progressing    Pt was up early  Met in the lounge. Ate 100% of meals  Overall mood is good   Affect is flat. Approachable and agreeable to check in  Pt stated she is just \"eating and sleeping\"    Pt was medication complaint  No PRN requested or given  MSSA score-3-discontinued at 1400  Pt was encouraged to attend group    Pt did not report any SI thoughts at this time  Anxiety was a \"maybe\"  Ns behaviors concerns noted   Pt spent most of the shift in her room    JOSHUA signed for mom        "

## 2025-05-19 NOTE — PLAN OF CARE
"  Problem: Depressive Symptoms  Goal: Social and Therapeutic (Depression)  Description: Signs and symptoms of listed problems will be absent or manageable.  Outcome: Progressing     Problem: Depressive Symptoms  Intervention: Social and Therapeutic Interv (Depressive Symptoms)  Flowsheets (Taken 5/19/2025 5049)  Social and Therapeutic Interventions (Depression):   encourage socialization with peers   encourage participation in therapeutic groups and milieu activities   Goal Outcome Evaluation:    Plan of Care Reviewed With: patient        Patient is more visible in the milieu, comes out for dinner, made some phone calls, still prefers to stay in her room most of the time, denies SI,SIB,hallucinations, denies anxiety and depression, denies any pain or discomfort, contracted for safety in the unit.  Patient ate 100% of dinner and snacks.  Informed patient labs for urine need to be done,specimen cup available in her room,   \" I will let staff know when it is ready..\"    Staff approached patient,reminded of urine sample, patient replied \" I told you I will let you know when its ready! \"    Will notify next shift to follow up urine collection.             "
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
## 2025-05-19 NOTE — PROGRESS NOTES
05/19/25 1442   Individualization/Patient Specific Goals   Patient Vulnerabilities history of unsuccessful treatment;lacks insight into illness;substance abuse/addiction   Interprofessional Rounds   Summary There was discussion about timeline for discharge   Participants advanced practice nurse;CTC;nursing   Behavioral Team Discussion   Participants PHUONG Conroy; Izabel Jasso, RN; HALLIE Freeman   Progress Minima. Recent admisstion   Anticipated length of stay 5 to 6 days   Continued Stay Criteria/Rationale SI and Alcohol intox   Medical/Physical See H&P   Precautions See below   Plan Stablize and discharge with outpatient supports   Rationale for change in precautions or plan Intial plan   Safety Plan To be completed prior to discharge   Anticipated Discharge Disposition home with family     PRECAUTIONS AND SAFETY    Behavioral Orders   Procedures    Code 1 - Restrict to Unit    [facility] Extended [facility]     Until discharge, Extended Care to offer psychotherapeutic services to mental health patients boarding for admission or stabilization. These services are to include but are not limited to: individual psychotherapy, diagnostic assessment, case management and care planning, safety planning, etc. This may include up to 1 visit per day. If patient is physically located at [facility] or [facility], group psychotherapy up to 2 time per day may be offered.     Routine Programming     As clinically indicated    Self Injury Precaution    Status 15     Every 15 minutes.    Suicide precautions: Suicide Risk: MODERATE; Clinical rationale to override score: lack of access to a plan for self-harm     Patients on Suicide Precautions should have a Combination Diet ordered that includes a Diet selection(s) AND a Behavioral Tray selection for Safe Tray - with utensils, or Safe Tray - NO utensils       Suicide Risk:   MODERATE     Clinical rationale to override score::   lack of access to a plan for self-harm    Withdrawal precautions        Safety  Safety WDL: WDL  Patient Location: patient room, own  Observed Behavior: calm, sleeping  Safety Measures: suicide assessment completed, safety rounds completed, environmental rounds completed, clinical history reviewed  Suicidality: Status 15, Behavioral scrubs (pajamas), Identify and strengthen protective factors, Promote patient engagement with treatment process, Optimize communication / relationship to minimize opportunity for self-harm  Withdrawal: monitor withdrawal process  Additional Documentation:  (also on sib prec)

## 2025-05-19 NOTE — PROGRESS NOTES
Problem: Sleep Disturbance  Goal: Adequate Sleep/Rest  Outcome: Progressing   Goal Outcome Evaluation:        Pt appeared to have slept for 6.5 hours.  Respirations are even and unlabored while asleep.  No safety/behavioral/medical concerns this shift.  No prn administered.  Pt remain on SI/SIB/Withdrawal precautions.  MSSA score was 5.

## 2025-05-19 NOTE — PLAN OF CARE
BEH IP Unit Acuity Rating Score (UARS)  Patient is given one point for every criteria they meet.    CRITERIA SCORING   On a 72 hour hold, court hold, committed, stay of commitment, or revocation. 1    Patient LOS on BEH unit exceeds 20 days. 0  LOS: 2   Patient under guardianship, 55+, otherwise medically complex, or under age 11. 0   Suicide ideation without relief of precipitating factors. 1   Current plan for suicide. 0   Current plan for homicide. 0   Imminent risk or actual attempt to seriously harm another without relief of factors precipitating the attempt. 0   Severe dysfunction in daily living (ex: complete neglect for self care, extreme disruption in vegetative function, extreme deterioration in social interactions). 1   Recent (last 7 days) or current physical aggression in the ED or on unit. 0   Restraints or seclusion episode in past 72 hours. 0   Recent (last 7 days) or current verbal aggression, agitation, yelling, etc., while in the ED or unit. 0   Active psychosis. 0   Need for constant or near constant redirection (from leaving, from others, etc).  0   Intrusive or disruptive behaviors. 0   Patient requires 3 or more hours of individualized nursing care per 8-hour shift (i.e. for ADLs, meds, therapeutic interventions). 0   TOTAL 3

## 2025-05-20 LAB
AMPHETAMINES UR QL SCN: NORMAL
BARBITURATES UR QL SCN: NORMAL
BENZODIAZ UR QL SCN: NORMAL
BZE UR QL SCN: NORMAL
CANNABINOIDS UR QL SCN: NORMAL
FENTANYL UR QL: NORMAL
HCG UR QL: NEGATIVE
OPIATES UR QL SCN: NORMAL
PCP QUAL URINE (ROCHE): NORMAL

## 2025-05-20 PROCEDURE — 250N000013 HC RX MED GY IP 250 OP 250 PS 637: Performed by: CLINICAL NURSE SPECIALIST

## 2025-05-20 PROCEDURE — 128N000002 HC R&B CD/MH ADOLESCENT

## 2025-05-20 PROCEDURE — 81025 URINE PREGNANCY TEST: CPT | Performed by: EMERGENCY MEDICINE

## 2025-05-20 PROCEDURE — 97150 GROUP THERAPEUTIC PROCEDURES: CPT | Mod: GO

## 2025-05-20 PROCEDURE — 80307 DRUG TEST PRSMV CHEM ANLYZR: CPT | Performed by: EMERGENCY MEDICINE

## 2025-05-20 PROCEDURE — 99232 SBSQ HOSP IP/OBS MODERATE 35: CPT | Performed by: CLINICAL NURSE SPECIALIST

## 2025-05-20 RX ORDER — QUETIAPINE FUMARATE 50 MG/1
50 TABLET, FILM COATED ORAL AT BEDTIME
Status: DISPENSED | OUTPATIENT
Start: 2025-05-20

## 2025-05-20 RX ORDER — LORATADINE 10 MG/1
10 TABLET ORAL DAILY PRN
Status: ACTIVE | OUTPATIENT
Start: 2025-05-20

## 2025-05-20 RX ORDER — SUMATRIPTAN SUCCINATE 25 MG/1
25 TABLET ORAL DAILY PRN
Status: ACTIVE | OUTPATIENT
Start: 2025-05-20

## 2025-05-20 RX ADMIN — QUETIAPINE FUMARATE 50 MG: 50 TABLET ORAL at 19:22

## 2025-05-20 RX ADMIN — NALTREXONE HYDROCHLORIDE 50 MG: 50 TABLET, FILM COATED ORAL at 08:11

## 2025-05-20 ASSESSMENT — ACTIVITIES OF DAILY LIVING (ADL)
ADLS_ACUITY_SCORE: 20
ADLS_ACUITY_SCORE: 20
DRESS: SCRUBS (BEHAVIORAL HEALTH);INDEPENDENT
ADLS_ACUITY_SCORE: 20
ORAL_HYGIENE: INDEPENDENT
ADLS_ACUITY_SCORE: 20
HYGIENE/GROOMING: INDEPENDENT
ADLS_ACUITY_SCORE: 20
LAUNDRY: WITH SUPERVISION
ADLS_ACUITY_SCORE: 20

## 2025-05-20 NOTE — PLAN OF CARE
"Team Note Due:  Monday    Assessment/Intervention/Current Symtoms and Care Coordination:  Chart review and met with team, discussed pt progress, symptomology, and response to treatment.  Discussed the discharge plan and any potential impediments to discharge.       CTC met with pt. Pt reports she did not call [facility] yesterday because \"I didn't feel like it.\" CTC encouraged pt to call [facility] today.     CTC made pc to [facility]. Pt was discharged due to being in the hospital, pt will need to complete a screening again and possibly be on a waitlist to return to the program.     CTC met with pt later in the day, she reports that she has not called yet, states that she has had multiple chances to call but just hasn't. She acknowledged that she needs to call.     Discharge Plan or Goal:  Dispo Plan: Pending stabilization.   Discharge Date/ time: TBD  Transportation:   AVS Completed: Yes [] No[]  Provisional Discharge: Yes[] No[]       Barriers to Discharge:  Patient requires further psychiatric stabilization due to current symptomology of SI as well as medication management.        Referral Status:  Pt wants to return to [facility], pt wants psychiatry.      Legal Status:  72 hour hold starting 5/17/25 at 604 - but voluntary as of 5/19 @1641  [facility] [place]  File Number:   Start and expiration date of commitment:     Contacts (include JOSHUA status):  JOSHUA for Pt's corazon Briggs [phone]      Upcoming Meetings and Dates/Important Information and next steps:  CTC to work with pt on pt calling [facility] to complete screening to return to [facility].  CTC to ensure psychiatry appointment scheduled.     "

## 2025-05-20 NOTE — PLAN OF CARE
Initial meeting note:    Therapist introduced self to patient and discussed psychotherapy service available to patient.     Pt response: Pt not interested currently in meeting 1:1; therapist will continue remaining available for pt     Plan: Pt was encouraged to attend groups and therapist will remain available for 1:1 sessions

## 2025-05-20 NOTE — DISCHARGE INSTRUCTIONS
Behavioral Discharge Planning and Instructions    Summary: You were admitted to @Unit@ on 5/16/2025 due to {Mental Health 1:605826}. You were treated by {N Providers:840532} and discharged on ***/***/2023 to {AVS D/C Locations:199075}.    Main Diagnosis:   ***    Health Care Follow-up:     Appointment: Psychiatry   Date/time: Monday June 9th, 2025 @ 4:00 pm Virtual  Provider:  Capri Jacome MSN CNP,PMHNP  Address: [address] , [address], [place] [address]  Phone: [phone]  Fax: [phone]  Note:   Before your appointment, you must speak with our Intake Department. Our intake team will attempt to contact you. If you do not hear from them within 24 hours, please call them at [phone] and tell them you are a [facility] referral. If you do not speak with our Intake Department and complete the necessary paperwork they send you, we cannot see you at your scheduled appointment time.   ** [facility]UC to fax AVS upon discharge, please.       Attend all scheduled appointments with your outpatient providers. Call at least 24 hours in advance if you need to reschedule an appointment to ensure continued access to your outpatient providers.     Major Treatments, Procedures and Findings:  You were provided with: {Major Treatments:219243}    Symptoms to Report: Feeling more aggressive, increased confusion, losing more sleep, mood getting worse, or thoughts of suicide.    Early warning signs can include: Increased depression or anxiety sleep disturbances increased thoughts or behaviors of suicide or self-harm  increased unusual thinking, such as paranoia or hearing voices.    Safety and Wellness: Take all medicines as directed. Make no changes unless your doctor suggests them. Follow treatment recommendations. Refrain from alcohol and non-prescribed drugs. Ask your support system to help you reduce your access to items that could harm yourself or others. If there is a concern for safety, call  "911.    Resources:   General Mental Health Resources:   National [place] on Mental Illness (KWAN) [place]: Connect for help, to navigate the mental health system, and for support and for resources. Call: 8-746-RISL-Helps / 4-802-154-3576  Crisis Text Line: The 24/7 emergency service is available if you or someone you know is experiencing a psychiatric or mental health crisis. Text: \"[place]\" to 749153  [place] Warmline: Are you an adult needing support? Talk to a specialist who has firsthand experience living with a mental health condition. Call: 759.610.3525  Text: \"Support\" to 39246  [facility].org/support/[place]-warmline/  National Suicide Prevention Lifeline: The 24/7 lifeline provides support when in distress, has prevention and crisis resources for you or your loved ones, and resources for professionals. Call 8-189-274-TALK (8096)  Peer Support Connection Warmlines: Hdok-rk-aals telephone support that s safe and supportive. Open 5 p.m. to 9 a.m. Call or text: 1-281.269.5058   COVID Cares Stress Phone Support Service. Any [place] experiencing stress can call 546-GDXT2BA (595-755-2944) for free telephone support from 9am to 9pm every day. The service is a collaboration with volunteers from the [place] Psychiatric Society, the [place] Psychological Association, the [place] [employer], and Mental Health [place]. The free service is also accessible at Clzby.org where searchers can also find psychiatric and mental health services availability and real-time Substance Use Disorder Treatment program openings.  Adult Rehabilitative Mental Health Services (ARMHS): https://[place].gov/dhs/partners-and-providers/policies-procedures/adult-mental-health/adult-rehabilitative-mental-health-services/armhs-certified-providers/  { RESOURCES Mb:001209}    Outpatient Psychiatry/Therapy Resources:   [facility] - (phone: 321.819.9576) " https://www.QuVIS.com/care/specialty/mental-behavioral-health/  https://www.QuVIS.Network Merchants/care/find/doctors/psychologists/  [facility] - (phone: 0-870-XZRATDXZ) https://www.[facility].org/treatments/Counseling-Adult  [facility] - (phone: 700.177.2815) https://[facility].[employer]/  [facility] - (phone: [phone])  https://[facility]  [facility] - (phone: 1-154.171.7489) https://www.Nuiku/  [facility] - (phone: 237.598.5570) https://www.JoturletherMicrosaic/  [facility] - (phone: 941.779.3712) https://RIWI/  [facility] - (phone: 438.411.2018) https://walkin.ViaSat/    General Medication Instructions:   See your medication sheet(s) for instructions.   Take all medicines as directed.  Make no changes unless your doctor suggests them.   Go to all your doctor visits.  Be sure to have all your required lab tests. This way, your medicines can be refilled on time.  Do not use any drugs not prescribed by your doctor.  Avoid alcohol.    Advance Directives:   Scanned document on file with [facility]? No scanned doc  Is document scanned? No. Copy Requested.  Honoring Choices Your Rights Handout: Informed and given  Was more information offered? Pt declined    The Treatment team has appreciated the opportunity to work with you. If you have any questions or concerns about your recent admission, you can contact the unit which can receive your call 24 hours a day, 7 days a week. They will be able to get in touch with a Provider if needed. The unit number is [phone].     hours a day, 7 days a week. They will be able to get in touch with a Provider if needed. The unit number is [phone] .

## 2025-05-20 NOTE — PLAN OF CARE
Rehab Group    Start time: 1400  End time: 1445  Patient time total: 45 minutes    attended full group    #4 attended   Group Type: occupational therapy   Group Topic Covered: balanced lifestyle, cognitive activities, coping skills, healthy leisure time, and problem solving       Group Session Detail:  Cognitive Leisure Task     Patient Response/Contribution:  cooperative with task, organized, supportive of peers, socially appropriate, expressed understanding of topic, attentive, and actively engaged       Patient Detail:    Leisure exploration and cognitive task to facilitate social and leisure engagement. Pt Response: Pt was able to follow 3 step directions of the novel task after an initial explanation and demonstration. Pt demonstrated good sequencing and planning ahead, and concentrated for the full duration of group. Positive group participant. Affect and mood brightened over the course of group time.       26022 OT Group (2 or more in attendance)      Patient Active Problem List   Diagnosis    Attention deficit hyperactivity disorder (ADHD), combined type    Severe recurrent major depression without psychotic features (H)    Suicidal ideation    Alcohol intoxication delirium    Major depressive disorder, remission status unspecified, unspecified whether recurrent    Alcohol dependence, continuous drinking behavior (H)

## 2025-05-20 NOTE — PLAN OF CARE
Rehab Group    Start time: 1015  End time: 1105  Patient time total: 40 minutes    attended full group    #8 attended   Group Type: occupational therapy   Group Topic Covered: balanced lifestyle, cognitive activities, coping skills, healthy leisure time, problem solving, and social skills       Group Session Detail:  OT CLINIC     Patient Response/Contribution:  cooperative with task, organized, socially appropriate, and actively engaged       Patient Detail:    Occupational therapy clinic to facilitate coping skill exploration, creative expression within personally meaningful activities, and clinical observation of social, cognitive, and kinesthetic performance skills. Pt response: Pt presented with calm mood, blunted affect. IND to initiate, gather materials, sequence, and adjust to workspace demands as needed for a structured task, following the lead of another peer. Pt was receptive to organizational/planning strategies suggested to her. Demonstrated good focus. Able to ask for assistance when needed.        [id number] OT Group (2 or more in attendance)      Patient Active Problem List   Diagnosis    Attention deficit hyperactivity disorder (ADHD), combined type    Severe recurrent major depression without psychotic features (H)    Suicidal ideation    Alcohol intoxication delirium    Major depressive disorder, remission status unspecified, unspecified whether recurrent    Alcohol dependence, continuous drinking behavior (H)

## 2025-05-20 NOTE — PLAN OF CARE
"Patient Self-Assessment: Pt was given and completed a written self-assessment form. OT staff reviewed with pt and explained the value of having them involved in their treatment plan, and provided options to meet current needs/self-identified goals.     What do you do during the day/evening? \"Php\"    What stressors do you face that trigger symptoms/substance use? \"All the above\" (relationships, finances, isolation, lack of routine/purpose)    Who are supportive people you enjoy spending time with? \"Nga\"    What are your positive qualities? \"Artsy\"    What helps you to calm down or relax? \"Ice pack and pinky, journaling\"    Coping Skills you'd like to explore in OT:     Guided Relaxation / Meditation    Physical Wellness / Exercise / Yoga / Adria Chi    Cooking / Baking (may not be an offered group)   x Journaling / Coloring / Drawing   x Arts & Crafts    Group Games    Mental Health Management / Discussion    Others:      What are your goals for when you leave the hospital?     Engage in OT group activities that support recovery     Work on self-cares to improve wellness    Practice using coping strategies to manage stress and reduce symptoms    Participate in healthy, meaningful leisure activities    Share thoughts and/or feelings on mental health or substance use    Improve focus and concentration during tasks   x Communicate needs effectively   x Increase confidence and self-esteem   x Other: \"figure out how to move out\"     PLAN: Will provide structure, support, and encouragement. Offer education on coping strategies and life management skills. Assist pt to increase self awareness regarding mental health issues. Will assess further in the areas of organization, problem solving, and concentration.       "

## 2025-05-20 NOTE — PLAN OF CARE
BEH IP Unit Acuity Rating Score (UARS)  Patient is given one point for every criteria they meet.    CRITERIA SCORING   On a 72 hour hold, court hold, committed, stay of commitment, or revocation. 1    Patient LOS on BEH unit exceeds 20 days. 0  LOS: 3   Patient under guardianship, 55+, otherwise medically complex, or under age 11. 0   Suicide ideation without relief of precipitating factors. 1   Current plan for suicide. 0   Current plan for homicide. 0   Imminent risk or actual attempt to seriously harm another without relief of factors precipitating the attempt. 0   Severe dysfunction in daily living (ex: complete neglect for self care, extreme disruption in vegetative function, extreme deterioration in social interactions). 1   Recent (last 7 days) or current physical aggression in the ED or on unit. 0   Restraints or seclusion episode in past 72 hours. 0   Recent (last 7 days) or current verbal aggression, agitation, yelling, etc., while in the ED or unit. 0   Active psychosis. 0   Need for constant or near constant redirection (from leaving, from others, etc).  0   Intrusive or disruptive behaviors. 0   Patient requires 3 or more hours of individualized nursing care per 8-hour shift (i.e. for ADLs, meds, therapeutic interventions). 0   TOTAL 3

## 2025-05-20 NOTE — PROGRESS NOTES
"[facility], [place]   Psychiatric Progress Note  Hospital Day: 3        Interim History:   The patient's care was discussed with the treatment team during the daily team meeting and/or staff's chart notes were reviewed.  Staff report patient slept 5.5 hours last night.. Patient did not report any acute medical concerns or side effects. No behavioral issues overnight, including violent or aggressive behaviors. Patient did not require seclusion or restraints. Patient is exhibiting signs/sx of psychosis or aurelio. Patient did not endorse suicidal ideation. Patient did not endorse HI. Patient is medication adherent. Patient is attending groups. Patient is sleeping well. Patient is eating adequately. Patient is attending to ADLs.    Upon interview, the patient was interviewed in her room in stations 6A.  Patient described her mood as \"bored\" she states she was unable to sleep  well in the night because the room was too hot.  She did not inform staff, because she thought the thermostat is centrally regulated.  She was encouraged to always inform staff of anything like that.  Patient had breakfast but awaiting her lunch, reports she did not take her Vyvanse this morning, because she felt it will be a waste taking the medication while she has nothing doing here. Patient reports that her mother brought her medication list last night. Current medications has been updated. Started her Fluoxetine 20 mg with the Pharm D support to increase to the PTA dose of 40 mg in one week.  She is also started on her Seroquel 50 mg at bedtime.  Patient denied auditory/visual hallucinations suicidal/homicidal ideation or thoughts of self-harm.    Suicidal ideation: denies current or recent suicidal ideation or behaviors.    Homicidal ideation: denies current or recent homicidal ideation or behaviors.    Psychotic symptoms: Odd patient denies AH, VH, paranoia, delusions.     Medication side effects reported: No " "significant side effects.    Acute medical concerns: none    Other issues reported by patient:  Patient had no further questions or concerns.           Medications:     Current Facility-Administered Medications   Medication Dose Route Frequency Provider Last Rate Last Admin    lisdexamfetamine (VYVANSE) capsule 40 mg  40 mg Oral QAM Hunter Liu APRN CNP        naltrexone (DEPADE/REVIA) tablet 50 mg  50 mg Oral Daily Hunter Liu, APRN CNP   50 mg at 05/20/25 0811          Allergies:   No Known Allergies       Labs:     Recent Results (from the past 24 hours)   HCG qualitative urine    Collection Time: 05/20/25  1:00 AM   Result Value Ref Range    hCG Urine Qualitative Negative Negative   Urine Drug Screen Panel    Collection Time: 05/20/25  1:00 AM   Result Value Ref Range    Amphetamines Urine Screen Negative Screen Negative    Barbituates Urine Screen Negative Screen Negative    Benzodiazepine Urine Screen Negative Screen Negative    Cannabinoids Urine Screen Negative Screen Negative    Cocaine Urine Screen Negative Screen Negative    Fentanyl Qual Urine Screen Negative Screen Negative    Opiates Urine Screen Negative Screen Negative    PCP Urine Screen Negative Screen Negative          Psychiatric Examination:     BP (!) 127/91 (BP Location: Left arm, Patient Position: Sitting, Cuff Size: Adult Regular)   Pulse 85   Temp 97.9  F (36.6  C) (Oral)   Resp 16   Ht 1.6 m (5' 3\")   Wt 44.2 kg (97 lb 8 oz)   SpO2 100%   BMI 17.27 kg/m    Weight is 97 lbs 8 oz  Body mass index is 17.27 kg/m .    Weight over time:  Vitals:    05/16/25 2223 05/17/25 1100   Weight: 45.1 kg (99 lb 8 oz) 44.2 kg (97 lb 8 oz)       Orthostatic Vitals       None              Cardiometabolic risk assessment. 05/20/25      Reviewed patient profile for cardiometabolic risk factors    Date taken /Value  REFERENCE RANGE   Abdominal Obesity  (Waist Circumference)   See nursing flowsheet Women >=35 in (88 cm)   Men >=40 in (102 cm)    " "  Triglycerides  Triglycerides   Date Value Ref Range Status   05/18/2025 43 <150 mg/dL Final       >=150 mg/dL (1.7 mmol/L) or current treatment for elevated triglycerides   HDL cholesterol  Direct Measure HDL   Date Value Ref Range Status   05/18/2025 150 >=50 mg/dL Final   ]   Women <50 mg/dL (1.3 mmol/L) in women or current treatment for low HDL cholesterol  Men <40 mg/dL (1 mmol/L) in men or current treatment for low HDL cholesterol     Fasting plasma glucose (FPG) Lab Results   Component Value Date     04/24/2025    GLC 76 04/24/2025      FPG >=100 mg/dL (5.6 mmol/L) or treatment for elevated blood glucose   Blood pressure  BP Readings from Last 3 Encounters:   05/19/25 (!) 127/91   04/25/25 98/72   06/11/23 (!) 140/84    Blood pressure >=130/85 mmHg or treatment for elevated blood pressure   Family History  See family history     Mental Status Exam:  Appearance: awake, alert, adequately groomed, dressed in hospital scrubs, and appeared as age stated  Attitude:  cooperative  Eye Contact:  good  Mood:  \"Bored\"  Affect:  intensity is blunted and intensity is flat  Speech:  clear, coherent  Language: fluent and intact in English  Psychomotor, Gait, Musculoskeletal:  no evidence of tardive dyskinesia, dystonia, or tics  Throught Process:  goal oriented  Associations:  no loose associations  Thought Content:  Denies auditory/visual hallucination, suicidal/homicidal ideation and thoughts of self-harm  Insight:  fair  Judgement:  limited  Oriented to:  time, person, and place  Attention Span and Concentration:  intact  Recent and Remote Memory:  fair  Fund of Knowledge:  appropriate           Precautions:     Behavioral Orders   Procedures    Code 1 - Restrict to Unit    AS Extended Care     Until discharge, Extended Care to offer psychotherapeutic services to mental health patients boarding for admission or stabilization. These services are to include but are not limited to: individual psychotherapy, " diagnostic assessment, case management and care planning, safety planning, etc. This may include up to 1 visit per day. If patient is physically located at [facility] or [facility], group psychotherapy up to 2 time per day may be offered.     Routine Programming     As clinically indicated    Self Injury Precaution    Status 15     Every 15 minutes.    Suicide precautions: Suicide Risk: MODERATE; Clinical rationale to override score: lack of access to a plan for self-harm     Patients on Suicide Precautions should have a Combination Diet ordered that includes a Diet selection(s) AND a Behavioral Tray selection for Safe Tray - with utensils, or Safe Tray - NO utensils       Suicide Risk:   MODERATE     Clinical rationale to override score::   lack of access to a plan for self-harm    Withdrawal precautions          Diagnoses:   Severe recurrent major depression without psychotic features  Attention deficit hyperactivity disorder (ADHD), combined type  Alcohol intoxication delirium  Alcohol dependence, continuous drinking behavior (H)      Clinically Significant Risk Factors                                    # Financial/Environmental Concerns: none                     Assessment & Plan:     Assessment and hospital summary:  This patient is a 24 year old female with history of Depression who presented to ED with SI  in context of alcohol intoxication. Symptoms and presentation at this time is most consistent with MDD. I have discussed the risks, benefits, and alternatives of medications. Patient is amenable to a trial. Patient will likely benefit from a residential treatment upon discharge.       Today's Changes:  Seroquel 50 mg at bedtime  Fluoxetine 20 mg oral daily    Target psychiatric symptoms and interventions   Lisdexamfetamine (Vyvanse )   Naltrexone (DEPADE/Revia) tablet 50 mg oral daily  Seroquel 50 mg at bedtime  Fluoxetine 20 mg oral daily  Risks, benefits, and alternatives discussed at length with patient.     Acute  Medical Problems and Treatments:  Acute medical concerns:  - No acute medical concerns    Pertinent labs/imaging:  UDS negative, HCG negative, Alcohol breath 0.294 (high).    Behavioral/Psychological/Social:  - Encourage unit programming    Safety:  - Continue precautions as noted above  - Status 15 minute checks      Legal Status: voluntary    Disposition Plan   Reason for ongoing admission: is unable to care for self due to severe psychosis or aurelio and requires detoxification from substance that poses a risk of bodily harm during withdrawal period  Discharge location: Plans to return to [facility]  Discharge Medications: not ordered  Follow-up Appointments: not scheduled    Entered by: PHUONG Stokes CNP on 05/20/2025  at 10:47 AM

## 2025-05-20 NOTE — PLAN OF CARE
"  Problem: Suicide Risk  Goal: Absence of Self-Harm  Description: Pt will remain safe on the unit as evidenced by pt identify and utilize 3 coping skills, attend 50% of programming and timely inform staff if not feel safe and/or have difficulty managing emotions or disturbing thoughts   Outcome: Progressing   Goal Outcome Evaluation:          Mental Health:  Pt awake at beginning of the shift observed standing by the front door waiting for breakfast; pt affect flat, quiet socially withdrawn, when asked about depression, pt replies, \"I'm in the hospital am I not?\" Sarcasm and smiles; denies any SI/SIB thoughts or hallucinations and endorses intermittent anxiety, but non currently, compliant with Naltrexone, but declines her Vyvanse medication stating, \"I don't think I want to take it in the hospital so I eat better,\" pt choice respected, after breakfast pt retreats back to her room to rest. Writer receives a fax from [facility] listing patient's medications:Fluoxetine 40 mg daily and Quetiapine  50 mg at HS for sleep-information forwarded to provider, MYA Liu, primary clinician. Pt encouraged to attend groups due to reports of \"being bored\" and given word finds and crossword puzzles to work on.    Medical:  Pt denies any physical pain; 100% of breakfast and 75% of lunch, pt sleeps almost 7 hours last night.     Vitals:   /85   Pulse 86   Temp 98.7  F (37.1  C) (Oral)   Resp 16   Ht 1.6 m (5' 3\")   Wt 46.6 kg (102 lb 11.2 oz)   SpO2 97%   BMI 18.19 kg/m    Pt reports \"I think I am going through withdrawal from the medications that I have not taken while in the hospital. Pt given extra fluids and snack around 11 am.     PRNs Given:  None    Continue to assess and monitor closely, suicide, self injury, withdrawal precautions and 15-minute checks for safety.                     "

## 2025-05-20 NOTE — PLAN OF CARE
Problem: Sleep Disturbance  Goal: Adequate Sleep/Rest  Outcome: Progressing   Goal Outcome Evaluation:         Pt resting  in bed with eyes closed,respirations regular, even and unlabored. arouses easily. No s/s of pain distress observed. Urine lab collected and sent to the lab. No acute concerns. Patient slept 5.5 hours.

## 2025-05-21 PROCEDURE — 97150 GROUP THERAPEUTIC PROCEDURES: CPT | Mod: GO

## 2025-05-21 PROCEDURE — 250N000013 HC RX MED GY IP 250 OP 250 PS 637: Performed by: CLINICAL NURSE SPECIALIST

## 2025-05-21 PROCEDURE — 128N000002 HC R&B CD/MH ADOLESCENT

## 2025-05-21 RX ADMIN — NALTREXONE HYDROCHLORIDE 50 MG: 50 TABLET, FILM COATED ORAL at 09:06

## 2025-05-21 RX ADMIN — QUETIAPINE FUMARATE 50 MG: 50 TABLET ORAL at 19:31

## 2025-05-21 RX ADMIN — FLUOXETINE HYDROCHLORIDE 20 MG: 20 CAPSULE ORAL at 09:06

## 2025-05-21 ASSESSMENT — ACTIVITIES OF DAILY LIVING (ADL)
ADLS_ACUITY_SCORE: 20
DRESS: SCRUBS (BEHAVIORAL HEALTH)
ADLS_ACUITY_SCORE: 20
ORAL_HYGIENE: INDEPENDENT
ADLS_ACUITY_SCORE: 20
LAUNDRY: UNABLE TO COMPLETE
ADLS_ACUITY_SCORE: 20
HYGIENE/GROOMING: INDEPENDENT;SHOWER
ADLS_ACUITY_SCORE: 20

## 2025-05-21 NOTE — PLAN OF CARE
"  Problem: Suicide Risk  Goal: Absence of Self-Harm  Description: Pt will remain safe on the unit as evidenced by pt identify and utilize 3 coping skills, attend 50% of programming and timely inform staff if not feel safe and/or have difficulty managing emotions or disturbing thoughts   Outcome: Progressing   Goal Outcome Evaluation:    Plan of Care Reviewed With: patient           Mental Health:  Pt presents to lounge affect flat/blunt, wearing scrub attire, quiet, declines her medications stating she is waiting for breakfast. Pt eats 100% of breakfast and returns to his room to rest. Writer asks pt about her medications and pt just lies in bed passively not coming to the med room for medications despite encouragement; Pt brought her medications, all but Vyvanese (declines) to pt at bedside. Pt denies any significant anxiety, no SI/SIB thoughts or hallucinations, but appears depressed and appears to be selectively isolating and withdrawn not taking care of self/asserting for self. Mom calls requesting update-writer spends 15 minutes on the phone. Mom reports she is unsure if she feel comfortable allowing pt to live with her and might prefer pt to go to residential treatment program. Mom encouraged to discuss her feelings with pt and might want to update the Team as pt may be thinking something else.   Mom brought in clothes for pt to wear but pt is not wearing them and Mom informed that pt needs to advocate for herself and to encourage pt to verbalize her needs to staff.. No  Medical:  Pt denies any physical pain     Vitals:   /81 (BP Location: Left arm, Patient Position: Sitting, Cuff Size: Adult Small)   Pulse 111   Temp 98.6  F (37  C) (Oral)   Resp 16   Ht 1.6 m (5' 3\")   Wt 46.6 kg (102 lb 11.2 oz)   SpO2 97%   BMI 18.19 kg/m         PRNs Given:  None    Continue to assess and monitor closely, 15 minute checks, suicide and self injury precautions for safety.               "

## 2025-05-21 NOTE — PLAN OF CARE
BEH IP Unit Acuity Rating Score (UARS)  Patient is given one point for every criteria they meet.    CRITERIA SCORING   On a 72 hour hold, court hold, committed, stay of commitment, or revocation. 0   Patient LOS on BEH unit exceeds 20 days. 0  LOS: 4   Patient under guardianship, 55+, otherwise medically complex, or under age 11. 0   Suicide ideation without relief of precipitating factors. 1   Current plan for suicide. 0   Current plan for homicide. 0   Imminent risk or actual attempt to seriously harm another without relief of factors precipitating the attempt. 0   Severe dysfunction in daily living (ex: complete neglect for self care, extreme disruption in vegetative function, extreme deterioration in social interactions). 1   Recent (last 7 days) or current physical aggression in the ED or on unit. 0   Restraints or seclusion episode in past 72 hours. 0   Recent (last 7 days) or current verbal aggression, agitation, yelling, etc., while in the ED or unit. 0   Active psychosis. 0   Need for constant or near constant redirection (from leaving, from others, etc).  0   Intrusive or disruptive behaviors. 0   Patient requires 3 or more hours of individualized nursing care per 8-hour shift (i.e. for ADLs, meds, therapeutic interventions). 0   TOTAL 2

## 2025-05-21 NOTE — PLAN OF CARE
Rehab Group    Start time: 1015  End time: 1100  Patient time total: 15 minutes    attended partial group    #5 attended   Group Type: occupational therapy   Group Topic Covered: coping skills and relaxation        Group Session Detail:  Health and Coping: Calming Collage     Patient Response/Contribution:  cooperative with task, organized, supportive of peers, and worked intermittently       Patient Detail:    Topic group for general health and coping focused on creative expression of coping skills. Pt Response: Pt arrived to group late, but was IND with task initiation. Pt selected a positive, calming theme for their coping skills collage. Limited energy observed/verbalized from patient as she requested to come back to project in a later group and elected to leave early.      [id number] OT Group (2 or more in attendance)      Patient Active Problem List   Diagnosis    Attention deficit hyperactivity disorder (ADHD), combined type    Severe recurrent major depression without psychotic features (H)    Suicidal ideation    Alcohol intoxication delirium    Major depressive disorder, remission status unspecified, unspecified whether recurrent    Alcohol dependence, continuous drinking behavior (H)

## 2025-05-21 NOTE — PLAN OF CARE
"Problem: Depressive Symptoms  Goal: Depressive Symptoms  Description: Signs and symptoms of listed problems will be absent or manageable.  Outcome: Progressing   Goal Outcome Evaluation:    Plan of Care Reviewed With: patient      /81 (BP Location: Left arm, Patient Position: Sitting, Cuff Size: Adult Small)   Pulse 111   Temp 98.6  F (37  C) (Oral)   Resp 16   Ht 1.6 m (5' 3\")   Wt 46.6 kg (102 lb 11.2 oz)   SpO2 97%   BMI 18.19 kg/m      Pt was isolative to her room the first part of the shift. Later she was notably out in the milieu but kept to self. During assessment pt reported feeling bored. Endorsed depression of 3, denied anxiety, SI/SIB/HI/A/V hallucinations. Pt also denied having any physical pain. Mood was calm with a bright affect on approach. Pt's food and fluids intake is adequate,ate 100% off her dinner tray. She was medication compliant, denied any medication adverse reaction. No behavior outburst. Her family paid her a visit which went well per pt.   "

## 2025-05-21 NOTE — PLAN OF CARE
INITIAL OT ASSESSMENT     05/19/25 1400   General Information   Date Initially Attended OT 05/20/25   Has Not Attended OT as of: 05/19/25   Clinical Impression   Affect Appropriate to situation   Orientation Oriented to person, place and time   Appearance and ADLs General cleanliness observed in most areas   Attention to Internal Stimuli No observed signs   Interaction Skills Interacts appropriately with staff;Interacts appropriately with peers   Ability to Communicate Needs Independent   Verbal Content Appropriate to topic   Ability to Maintain Boundaries Maintains appropriate physical boundaries;Maintains appropriate verbal boundaries   Participation Initiates participation   Concentration Concentrates 20-30 minutes   Ability to Concentrate With structure   Follows and Comprehends Directions Independently follows multi-step directions   Memory Delayed and immediate recall intact   Organization Independently organizes all tasks   Decision Making Independent   Planning and Problem Solving Needs further assessment   Ability to Apply and Learn Concepts Comprehends concepts, but needs assist to apply   Frustrations / Stress Tolerance Independently identifies sources of frustration/stress   Level of Insight Some insight   Self Esteem Needs further assessment   Social Supports Has knowledge of support systems   BEH OT Care Plan Goals   OT Care Plan coping with symptoms

## 2025-05-21 NOTE — PLAN OF CARE
Team Note Due:  Monday    Assessment/Intervention/Current Symtoms and Care Coordination:  Chart review and met with team, discussed pt progress, symptomology, and response to treatment.  Discussed the discharge plan and any potential impediments to discharge.    HALLIE met with pt. She reports that she still has not called [facility]. She denied wanting to do another program, she states she wants to do the program with [facility]. CTC encouraged pt to call today and ensured she still had the number to call.      HALLIE received p.c from pt's mom. She reports that she is concerned about pt when she is drinking. She is ambivalant about taking pt back home, she stated she would take her home only if she promises that she won't drink, but also recognized pt may just say she won't drink and then will. Pt's mom wants her to go to residential and thinks [facility] in [place] would be a good option for pt. She states that pt is working on moving to [place] and that she only wants to bide her time until she can go there, but that this process can take about a month anyway so she wants pt to go to residential until this move. She does recognize pt is voluntary though.     HALLIE met with pt to discuss residential. Pt reports that she does not want to do residential. She is ok with doing PHP until she can move to [place]. She reports that she is not at a stage to stop drinking, that the drinking is due to her mental health symptoms and that it helps her cope. She states that the ultimate goal is sobriety, but that she is not there yet. She reports she hasn't called [facility], but will.     Discharge Plan or Goal:  Dispo Plan: Pending stabilization.   Discharge Date/ time: TBD  Transportation:   AVS Completed: Yes [] No[]  Provisional Discharge: Yes[] No[]       Barriers to Discharge:  Patient requires further psychiatric stabilization due to current symptomology of SI as well as medication management.        Referral  Status:  Pt wants to return to [facility], pt wants psychiatry.      Legal Status:  72 hour hold starting 5/17/25 at 604 - but voluntary as of 5/19 @5421  County: [place]  File Number:   Start and expiration date of commitment:     Contacts (include JOSHUA status):  JOSHUA for Pt's mom   Rosa Briggs [phone]      Upcoming Meetings and Dates/Important Information and next steps:  CTC to work with pt on pt calling [facility] to complete screening to return to PHP.

## 2025-05-21 NOTE — PLAN OF CARE
Goal Outcome Evaluation:  Problem: Sleep Disturbance  Goal: Adequate Sleep/Rest  Outcome: Progressing       Pt in bed at beginning of shift, breathing quiet and unlabored. Pt slept through shift. Pt slept 6 hours.      No pt complaints or concerns at this time.      No PRNs given. Will continue to monitor.

## 2025-05-22 VITALS
HEIGHT: 63 IN | SYSTOLIC BLOOD PRESSURE: 133 MMHG | TEMPERATURE: 98 F | OXYGEN SATURATION: 100 % | WEIGHT: 102.7 LBS | DIASTOLIC BLOOD PRESSURE: 84 MMHG | BODY MASS INDEX: 18.2 KG/M2 | HEART RATE: 84 BPM | RESPIRATION RATE: 16 BRPM

## 2025-05-22 PROCEDURE — 128N000002 HC R&B CD/MH ADOLESCENT

## 2025-05-22 PROCEDURE — 90837 PSYTX W PT 60 MINUTES: CPT

## 2025-05-22 PROCEDURE — 97150 GROUP THERAPEUTIC PROCEDURES: CPT | Mod: GO

## 2025-05-22 PROCEDURE — 250N000013 HC RX MED GY IP 250 OP 250 PS 637: Performed by: CLINICAL NURSE SPECIALIST

## 2025-05-22 RX ADMIN — NALTREXONE HYDROCHLORIDE 50 MG: 50 TABLET, FILM COATED ORAL at 08:31

## 2025-05-22 RX ADMIN — QUETIAPINE FUMARATE 50 MG: 50 TABLET ORAL at 20:36

## 2025-05-22 RX ADMIN — FLUOXETINE HYDROCHLORIDE 20 MG: 20 CAPSULE ORAL at 08:31

## 2025-05-22 ASSESSMENT — ACTIVITIES OF DAILY LIVING (ADL)
ADLS_ACUITY_SCORE: 20
ORAL_HYGIENE: INDEPENDENT
ADLS_ACUITY_SCORE: 20
HYGIENE/GROOMING: INDEPENDENT
ADLS_ACUITY_SCORE: 20
LAUNDRY: UNABLE TO COMPLETE
ADLS_ACUITY_SCORE: 20
ADLS_ACUITY_SCORE: 25
DRESS: INDEPENDENT;SCRUBS (BEHAVIORAL HEALTH)
ADLS_ACUITY_SCORE: 20
ADLS_ACUITY_SCORE: 20
HYGIENE/GROOMING: INDEPENDENT
ADLS_ACUITY_SCORE: 20
ORAL_HYGIENE: INDEPENDENT
ADLS_ACUITY_SCORE: 20
ADLS_ACUITY_SCORE: 25
DRESS: SCRUBS (BEHAVIORAL HEALTH);INDEPENDENT
LAUNDRY: UNABLE TO COMPLETE

## 2025-05-22 NOTE — PLAN OF CARE
"Individual Therapy Note      Date of Service: May 22, 2025    Patient: Sara goes by \"Sara,\" uses she/her pronouns    Individuals Present: Sara Elizabeth Deys[facility]    Session start: 1010  Session end: 1110  Session duration in minutes: 60      Modality Used: Rapport Building, Motivational Interviewing, and Solution Focused    Goals: Complete a safety plan, verbal processing    Patient Description of current symptoms: bored     Mental Status Exam:   Attitude: cooperative  Eye Contact: good  Mood: depressed  Affect: appropriate and in normal range  Speech: clear, coherent  Psychomotor Behavior: no evidence of tardive dyskinesia, dystonia, or tics  Thought Process:  logical  Associations: no loose associations  Thought Content: no evidence of suicidal ideation or homicidal ideation  Insight: good  Judgement: intact  Attention Span and Concentration: intact    Pt progress: Met patient to complete a safety plan. We processed emotions. Patient has a hard time connecting with her emotions. She drinks because she is able to feel and unpack things. She reported that her brain hides things. She describes it as locked doors that open when drinking. Patient reported that she is numb when she is sober, but drinking accentuates her emotions. She is currently feeling stuck and has no motivation.. She reports that she has been isolating a lot and tends to procrastinate. She has been paying it safe, but it isn't working. She wants to finish PHP and move to [place] with a friend. We discussed the stress of moving and how it could send her backwards in her mental health. We discussed next steps and patient decided to invite her cousin to go to the megan pit which will involve initiating and coming out of isolation. Writer encouraged patient to become more aware of negative thinking patterns.    Treatment Objective(s) Addressed:   The focus of this session was on building rapport, safety planning and processing feelings. "     Progress Towards Goals and Assessment of Patient:   Patient is making progress towards treatment goals as evidenced by improved mood.       Therapeutic Intervention(s):   Provided active listening, unconditional positive regard, and validation.   Engaged in safety planning identifying coping skills, warning signs, health support and resources, Engaged in guided discovery, explored patient's perspectives and helped expand them through socratic dialogue, Taught the link between thoughts, feelings, and behaviors, and Using CBT tools and instruction to improve insight, awareness, identifying unhealthy patterns and self-talk and replacing with healthier patterns and self-talk    Safety Plan: completed with patient    Plan for future action (include changes needed if current intervention is ineffective):  no further intervention    26000 - Psychotherapy (with patient) - 60 (53+*) min    Patient Active Problem List   Diagnosis    Attention deficit hyperactivity disorder (ADHD), combined type    Severe recurrent major depression without psychotic features (H)    Suicidal ideation    Alcohol intoxication delirium    Major depressive disorder, remission status unspecified, unspecified whether recurrent    Alcohol dependence, continuous drinking behavior (H)

## 2025-05-22 NOTE — PLAN OF CARE
Problem: Sleep Disturbance  Goal: Adequate Sleep/Rest  Outcome: Progressing   Goal Outcome Evaluation:     NOC 5/21/25-5/22/2025    Focus: Shift summary    Data: Patient slept 6.75 hours last night. No interventions needed. Respirations even and unlabored on status 15 checks. Will continue to monitor and report to oncoming staff.    Response: Report sleep hours to day shift. Continue to monitor patient and provide therapeutic interventions as necessary.

## 2025-05-22 NOTE — PLAN OF CARE
Rehab Group    Start time: 1115  End time: 1200  Patient time total: 30 minutes    attended partial group    #4 attended   Group Type: occupational therapy   Group Topic Covered: balanced lifestyle, cognitive activities, coping skills, healthy leisure time, problem solving, and social skills       Group Session Detail:  OT CLINIC     Patient Response/Contribution:  cooperative with task, organized, socially appropriate, and actively engaged       Patient Detail:    Occupational therapy clinic to facilitate coping skill exploration, creative expression within personally meaningful activities, and clinical observation of social, cognitive, and kinesthetic performance skills. Pt response: Pt presented with bright affect, positive mood, eager to engage in a project. IND to initiate, gather materials, sequence, and adjust to workspace demands as needed for a novel beading task. IND With all performance skills. Demonstrated good focus, planning, and attention to detail for task. Able to ask for assistance and appeared comfortable interacting with peers and staff. Pleasant group participant.         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

## 2025-05-22 NOTE — PLAN OF CARE
"Problem: Depressive Symptoms  Goal: Depressive Symptoms  Description: Signs and symptoms of listed problems will be absent or manageable.  Outcome: Progressing   Goal Outcome Evaluation:  /81 (BP Location: Left arm, Patient Position: Sitting, Cuff Size: Adult Regular)   Pulse 96   Temp 98.6  F (37  C) (Oral)   Resp 16   Ht 1.6 m (5' 3\")   Wt 46.6 kg (102 lb 11.2 oz)   SpO2 98%   BMI 18.19 kg/m      Pt  spent most of her time this evening in her room intermittently napping. She reported doing \"fine\" she denied anxiety, depression,  SI/SIB/HI/A/V hallucinations. Pt also denied having any physical pain. Mood was calm with a bright affect on approach. Pt's food and fluids intake is adequate,ate 100% off her dinner tray. She was medication compliant, denied any medication adverse reaction. No behavior outburst.   "

## 2025-05-22 NOTE — PLAN OF CARE
"  Problem: Suicide Risk  Goal: Absence of Self-Harm  Description: Pt will remain safe on the unit as evidenced by pt identify and utilize 3 coping skills, attend 50% of programming and timely inform staff if not feel safe and/or have difficulty managing emotions or disturbing thoughts   Outcome: Slight Progress   Goal Outcome Evaluation:    Plan of Care Reviewed With: patient       Mental Health:  Pt visible in lounge in scrub attire, groomed appearance, social with peers and retreats back to their room to sleep; writer steps in to check in with pt who begins laughing when writer states pt is avoiding their medications; pt endorses fatigue, low motivation and \"I like to sleep\" \"yes I feel better than when I came in, but I still have to find out what is missing because I feel numb IF I don't drink,\" pt admits that she is unable nor does she want to give up drinking, \"its the only thing that helps me get in touch with my feelings and I can figure things out more,\" pt denies any SI/SIB thoughts or hallucinations, states anxiety is \"low\" \"I attend some groups, but not all,\" pt states, med compliant -brought to bedside-no observed or reported side effects, pt denial of seriousness of the consequences of her drinking alcohol as not desire to commit to not drinking,  thoughts/perception is altered; pt wishes to live outside mothers home, but would not like to go to a shelter. Poor insight/judgement; Pt given education on alcoholics anonymous and the numbers of young persons in this support group. Pt observed using phone and attends a group.    Medical:  Pt denies any physical pain; appetite 75% and \"likes to sleep\".     Vitals:   /86 (BP Location: Left arm, Patient Position: Sitting, Cuff Size: Adult Small)   Pulse 102   Temp 98.2  F (36.8  C) (Oral)   Resp 16   Ht 1.6 m (5' 3\")   Wt 46.6 kg (102 lb 11.2 oz)   SpO2 98%   BMI 18.19 kg/m       PRNs Given:  None    Continue to assess and monitor closely, " 15-minute checks, suicide and self injury precautions for safety.

## 2025-05-22 NOTE — PLAN OF CARE
Team Note Due:  Monday    Assessment/Intervention/Current Symtoms and Care Coordination:  Chart review and met with team, discussed pt progress, symptomology, and response to treatment.  Discussed the discharge plan and any potential impediments to discharge.    HALLIE received p.c from pt's mom Sarah. She states that she isn't sure she wants pt to return home and continues to want pt to go to residential. CTC discussed what would happen if pt requests to leave and the provider does not believe pt meets criteria for a hold. She wanted to know what other options are available for pt. CTC educated that if pt is voluntary and does not want residential we cannot force her to go to residential.     HALLIE has received 2 VM's from [facility] regarding pt and placement. [facility] does not have an JOSHUA to speak with [facility] so [facility] has not returned the calls.     [facility] spoke with pt. CTC discussed residential again, pt reports that she does not want to do this. She states that she doesn't want to do [facility]. She did call [facility] to do the screening and wants to do the PHP again and go home.      Discharge Plan or Goal:  Dispo Plan: Pending stabilization. Consider return home with [facility].   Discharge Date/ time: TBD  Transportation:   AVS Completed: Yes [] No[]  Provisional Discharge: Yes[] No[]       Barriers to Discharge:  Patient requires further psychiatric stabilization due to current symptomology of SI as well as medication management.        Referral Status:  Pt wants to return to [facility], pt wants psychiatry.      Legal Status:  72 hour hold starting 5/17/25 at 604 - but voluntary as of 5/19 @1641  [facility]: [place]  File Number:   Start and expiration date of commitment:     Contacts (include JOSHUA status):  JOSHUA for Pt's corazon Briggs [phone]      Upcoming Meetings and Dates/Important Information and next steps:  CTC to follow up with [facility] regarding returning to the PHP.

## 2025-05-23 PROCEDURE — 128N000002 HC R&B CD/MH ADOLESCENT

## 2025-05-23 PROCEDURE — 99232 SBSQ HOSP IP/OBS MODERATE 35: CPT | Performed by: CLINICAL NURSE SPECIALIST

## 2025-05-23 PROCEDURE — H2032 ACTIVITY THERAPY, PER 15 MIN: HCPCS

## 2025-05-23 PROCEDURE — 250N000013 HC RX MED GY IP 250 OP 250 PS 637: Performed by: CLINICAL NURSE SPECIALIST

## 2025-05-23 RX ORDER — ATOMOXETINE 10 MG/1
10 CAPSULE ORAL DAILY
Status: DISPENSED | OUTPATIENT
Start: 2025-05-23

## 2025-05-23 RX ADMIN — QUETIAPINE FUMARATE 50 MG: 50 TABLET ORAL at 21:08

## 2025-05-23 RX ADMIN — ATOMOXETINE 10 MG: 10 CAPSULE ORAL at 14:46

## 2025-05-23 RX ADMIN — NALTREXONE HYDROCHLORIDE 50 MG: 50 TABLET, FILM COATED ORAL at 08:42

## 2025-05-23 RX ADMIN — FLUOXETINE HYDROCHLORIDE 20 MG: 20 CAPSULE ORAL at 08:42

## 2025-05-23 ASSESSMENT — ACTIVITIES OF DAILY LIVING (ADL)
ADLS_ACUITY_SCORE: 25
ORAL_HYGIENE: INDEPENDENT
ADLS_ACUITY_SCORE: 25
ADLS_ACUITY_SCORE: 25
DRESS: INDEPENDENT
ADLS_ACUITY_SCORE: 25
HYGIENE/GROOMING: INDEPENDENT
ADLS_ACUITY_SCORE: 25

## 2025-05-23 NOTE — PLAN OF CARE
Problem: Adult Behavioral Health Plan of Care  Goal: Plan of Care Review  Outcome: Progressing  Flowsheets (Taken 5/22/2025 1658)  Patient Agreement with Plan of Care: agrees     Problem: Psychotic Symptoms  Goal: Psychotic Symptoms  Description: Signs and symptoms of listed problems will be absent or manageable.  Outcome: Progressing   Goal Outcome Evaluation:    Plan of Care Reviewed With: patient          Problem: Adult Behavioral Health Plan of Care  Goal: Plan of Care Review  Outcome: Progressing  Flowsheets (Taken 5/22/2025 1658)  Patient Agreement with Plan of Care: agrees     Problem: Psychotic Symptoms  Goal: Psychotic Symptoms  Description: Signs and symptoms of listed problems will be absent or manageable.  Outcome: Progressing   Pt denies all psych symptoms including anxiety, depression, SI/SIB/HI/AVH, and contracted for safety. Up and visible in the milieu all shift interacting with peers and watching TV. Pt is pleasant with a flat affect. She stated that her mood was good. Pt's love ones visited. She stated that the visit went well. Pt continued to be pleasant, calm, cooperative, and medication compliant. No safety or behavioral concerns noted. Will continue with same plan of care.

## 2025-05-23 NOTE — PLAN OF CARE
Problem: Adult Inpatient Plan of Care  Goal: Optimal Comfort and Wellbeing  Outcome: Progressing     Problem: Adult Behavioral Health Plan of Care  Goal: Adheres to Safety Considerations for Self and Others  Outcome: Progressing   Goal Outcome Evaluation:

## 2025-05-23 NOTE — PLAN OF CARE
Team Note Due:  Monday    Assessment/Intervention/Current Symtoms and Care Coordination:  Chart review and met with team, discussed pt progress, symptomology, and response to treatment.  Discussed the discharge plan and any potential impediments to discharge.    CTC made p.c to [facility]. They report that the recommendation from the screening is [facility] PHP and once they talk with pt the care coordinator will discuss what the waitlist looks like. They report they left pt a VM regarding recommendation. CTC asked that they call the unit to discus this with pt as she does not have access to phone.     CTC received p.c from [facility]. They report that pt can start the PHP as soon as 5/29. They will run pt's insurance and get back to [facility].     CTC received VM from Sarah, pt's mom. She asks if [facility] would be better for pt to attend. She also wanted to know if pt should do psych testing as they want diagnostic clarification.     CTC met with pt and discussed pt's mom's concerns. She reports she wants to do the [place] PHP and does not want psych testing at this time.     Discharge Plan or Goal:  Dispo Plan: Pending stabilization. Consider return home with PHP.   Discharge Date/ time: TBD  Transportation:   AVS Completed: Yes [] No[]  Provisional Discharge: Yes[] No[]       Barriers to Discharge:  Patient requires further psychiatric stabilization due to current symptomology of SI as well as medication management.        Referral Status:  Pt wants to return to [facility], pt wants psychiatry.      Legal Status:  72 hour hold starting 5/17/25 at 604 - but voluntary as of 5/19 @1641  [facility]: [place]  File Number:   Start and expiration date of commitment:     Contacts (include JOSHUA status):  JOSHUA for Pt's mom   Sarah Briggs [phone]      Upcoming Meetings and Dates/Important Information and next steps:  CTC to follow up with [facility] regarding start date for the PHP.

## 2025-05-23 NOTE — PROGRESS NOTES
"  Rehab Group    Start time: 1315  End time: 1415  Patient time total: 45 minutes    attended full group    #4 attended   Group Type: art   Group Topic Covered: activity therapy       Group Session Detail:  Art Therapy directive was to create future-focused artwork, \"a postcard from future self,\" with the goals of: identifying personal strengths and goals, assessing pts motivation for change, creating a visual self narrative, emotional expression, expressing aspects of self.     Patient Response/Contribution:  cooperative with task       Patient Detail:    Pt was an engaged participant, focused on task for the full time that pt attended group. Pt began a watercolor postcard from future self and needs another AT group to finish and verbally process. Pt also started a resiliency collage and needs another AT group to finish this project as well.  Pts mood was calm, pleasant participant.      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

## 2025-05-23 NOTE — PROGRESS NOTES
"[facility], [place]   Psychiatric Progress Note  Hospital Day: 6        Interim History:   The patient's care was discussed with the treatment team during the daily team meeting and/or staff's chart notes were reviewed.  Staff report patient slept 7 hours last night. Pt denies all psych symptoms including anxiety, depression, SI/SIB/HI/AVH, and contracted for safety. Up and visible in the milieu all shift interacting with peers and watching TV. Pt is pleasant with a flat affect. She stated that her mood was good. Pt's love ones visited. She stated that the visit went well. Pt continued to be pleasant, calm, cooperative, and medication compliant. No safety or behavioral concerns noted.       Upon interview, the patient was interviewed in her room in stations 6A.  Patient reports her mood is better, states \"I feel more of myself and more comfortable.\"  Patient requested to start Strattera one of the PTA medications.  She still does not want to take Vyvanse.  Patient believes that her medications are working with no side effect experienced.  Patient rated anxiety as 0/10 and depression as 3/10.  She denies auditory/visual hallucinations, suicidal/homicidal ideations and thoughts of self-harm.  Patient plans to attend PHP program after discharge from the hospital.  She reports doing the screening yesterday awaiting their feedback but she is hopeful that she will start the program as planned.  [facility] reports that patient's mom is requesting for psych testing.  Provider discussed this with the patient, she denies any knowledge of such, but plan to reach out to her mom about it. [facility] reports she received p.c from [facility]. They report that pt can start the PHP as soon as 5/29. They will run pt's insurance and get back to [facility].  Provider discussed with the [facility] the patient can be discharged on Wednesday next week to be able to attend the program.    Suicidal ideation: denies current or recent suicidal " "ideation or behaviors.    Homicidal ideation: denies current or recent homicidal ideation or behaviors.    Psychotic symptoms: Odd patient denies AH, VH, paranoia, delusions.     Medication side effects reported: No significant side effects.    Acute medical concerns: none    Other issues reported by patient:  Patient had no further questions or concerns.           Medications:     Current Facility-Administered Medications   Medication Dose Route Frequency Provider Last Rate Last Admin    atomoxetine (STRATTERA) capsule 10 mg  10 mg Oral Daily Hunter Liu APRN CNP        FLUoxetine (PROzac) capsule 20 mg  20 mg Oral Daily Hunter Liu APRN CNP   20 mg at 05/23/25 0842    lisdexamfetamine (VYVANSE) capsule 40 mg  40 mg Oral QAM Hunter Liu APRN CNP        naltrexone (DEPADE/REVIA) tablet 50 mg  50 mg Oral Daily Hunter Liu APRN CNP   50 mg at 05/23/25 0842    QUEtiapine (SEROquel) tablet 50 mg  50 mg Oral At Bedtime Hunter Liu APRN CNP   50 mg at 05/22/25 2036          Allergies:   No Known Allergies       Labs:     No results found for this or any previous visit (from the past 24 hours).         Psychiatric Examination:     /85 (BP Location: Left arm, Patient Position: Sitting, Cuff Size: Adult Regular)   Pulse 92   Temp 97.9  F (36.6  C) (Oral)   Resp 18   Ht 1.6 m (5' 3\")   Wt 46.6 kg (102 lb 11.2 oz)   SpO2 99%   BMI 18.19 kg/m    Weight is 102 lbs 11.2 oz  Body mass index is 18.19 kg/m .    Weight over time:  Vitals:    05/16/25 2223 05/17/25 1100 05/20/25 0900   Weight: 45.1 kg (99 lb 8 oz) 44.2 kg (97 lb 8 oz) 46.6 kg (102 lb 11.2 oz)       Orthostatic Vitals       None              Cardiometabolic risk assessment. 05/20/25      Reviewed patient profile for cardiometabolic risk factors    Date taken /Value  REFERENCE RANGE   Abdominal Obesity  (Waist Circumference)   See nursing flowsheet Women >=35 in (88 cm)   Men >=40 in (102 cm)      Triglycerides  Triglycerides " "  Date Value Ref Range Status   05/18/2025 43 <150 mg/dL Final       >=150 mg/dL (1.7 mmol/L) or current treatment for elevated triglycerides   HDL cholesterol  Direct Measure HDL   Date Value Ref Range Status   05/18/2025 150 >=50 mg/dL Final   ]   Women <50 mg/dL (1.3 mmol/L) in women or current treatment for low HDL cholesterol  Men <40 mg/dL (1 mmol/L) in men or current treatment for low HDL cholesterol     Fasting plasma glucose (FPG) Lab Results   Component Value Date     04/24/2025    GLC 76 04/24/2025      FPG >=100 mg/dL (5.6 mmol/L) or treatment for elevated blood glucose   Blood pressure  BP Readings from Last 3 Encounters:   05/23/25 135/85   04/25/25 98/72   06/11/23 (!) 140/84    Blood pressure >=130/85 mmHg or treatment for elevated blood pressure   Family History  See family history     Mental Status Exam:  Appearance: awake, alert, adequately groomed, dressed in hospital scrubs, and appeared as age stated  Attitude:  cooperative  Eye Contact:  good  Mood:  \"Better\"  Affect:  intensity is blunted and intensity is flat  Speech:  clear, coherent  Language: fluent and intact in English  Psychomotor, Gait, Musculoskeletal:  no evidence of tardive dyskinesia, dystonia, or tics  Throught Process:  goal oriented  Associations:  no loose associations  Thought Content:  Denies auditory/visual hallucination, suicidal/homicidal ideation and thoughts of self-harm  Insight:  fair  Judgement: Fair  Oriented to:  time, person, and place  Attention Span and Concentration:  intact  Recent and Remote Memory:  fair  Fund of Knowledge:  appropriate           Precautions:     Behavioral Orders   Procedures    Code 1 - Restrict to Unit    AS Extended Care     Until discharge, Extended Care to offer psychotherapeutic services to mental health patients boarding for admission or stabilization. These services are to include but are not limited to: individual psychotherapy, diagnostic assessment, case management and " care planning, safety planning, etc. This may include up to 1 visit per day. If patient is physically located at [facility] or [facility], group psychotherapy up to 2 time per day may be offered.     Routine Programming     As clinically indicated    Self Injury Precaution    Status 15     Every 15 minutes.    Suicide precautions: Suicide Risk: MODERATE; Clinical rationale to override score: lack of access to a plan for self-harm     Patients on Suicide Precautions should have a Combination Diet ordered that includes a Diet selection(s) AND a Behavioral Tray selection for Safe Tray - with utensils, or Safe Tray - NO utensils       Suicide Risk:   MODERATE     Clinical rationale to override score::   lack of access to a plan for self-harm    Withdrawal precautions          Diagnoses:   Severe recurrent major depression without psychotic features  Attention deficit hyperactivity disorder (ADHD), combined type  Alcohol intoxication delirium  Alcohol dependence, continuous drinking behavior (H)      Clinically Significant Risk Factors                                    # Financial/Environmental Concerns: none                     Assessment & Plan:     Assessment and hospital summary:  This patient is a 24 year old female with history of Depression who presented to ED with SI  in context of alcohol intoxication. Symptoms and presentation at this time is most consistent with MDD. I have discussed the risks, benefits, and alternatives of medications. Patient is amenable to a trial. Patient will likely benefit from a residential treatment upon discharge.     5/25/2025: Patient has not taking Vyvanse since admission.  She felt she does not need it as she is not involved in any activity during this hospitalization.  Patient believes that taking it will amount to a waste.  Today she requested for her ADHD medication Strattera, she does not remember how much she was taking before, but remembers it has been over a week that she took the  medication. With the Pharm.D. support that is reasonable to start her on 10 mg.  Patient has been started on the Strattera were Vyvanse has been discontinued.        Today's Changes:  Strattera capsule 10 mg oral daily  Discontinue Vyvanse capsule 40 mg oral daily.  (Patient has not been taking since admission)    Target psychiatric symptoms and interventions  Strattera 10 mg oral daily  Naltrexone (DEPADE/Revia) tablet 50 mg oral daily  Seroquel 50 mg at bedtime  Fluoxetine 20 mg oral daily  Risks, benefits, and alternatives discussed at length with patient.     Acute Medical Problems and Treatments:  Acute medical concerns:  - No acute medical concerns    Pertinent labs/imaging:  UDS negative, HCG negative, Alcohol breath 0.294 (high).    Behavioral/Psychological/Social:  - Encourage unit programming    Safety:  - Continue precautions as noted above  - Status 15 minute checks      Legal Status: voluntary    Disposition Plan   Reason for ongoing admission: is unable to care for self due to severe psychosis or aurelio and requires detoxification from substance that poses a risk of bodily harm during withdrawal period  Discharge location: Plans to return to [facility]  Discharge Medications: not ordered  Follow-up Appointments: not scheduled    Entered by: PHUONG Stokes CNP on 05/23/2025  at 1:28 PM

## 2025-05-23 NOTE — PROGRESS NOTES
"  Rehab Group    Start time: 1015  End time: 1115  Patient time total: 60 minutes    attended full group    #5 attended   Group Type: art   Group Topic Covered: activity therapy       Group Session Detail:  Art Therapy directive(s);  \"Best Possible Resilient Self:\" Self visualization of challenging event-overcoming obstacle-drawing/collage self symbol of most resilient self or  Collage/drawing of \"a living being that survives in a harsh environment.\"  Goals of directive: expressing aspects of self/identity, identifying personal strengths and goals, emotional expression, emotional regulation     Patient Response/Contribution:  cooperative with task       Patient Detail:    Pt was an engaged participant, focused on task for the full duration of group. Pt is working on a collage and was engaged in process. Pt needs another AT group to finish and verbally process.  Pts mood was calm, pleasant participant.      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

## 2025-05-23 NOTE — PLAN OF CARE
Problem: Adult Inpatient Plan of Care  Goal: Optimal Comfort and Wellbeing  5/23/2025 1421 by Ajay Joy RN  Outcome: Progressing  5/23/2025 1226 by Ajay Joy RN  Outcome: Progressing     Problem: Adult Behavioral Health Plan of Care  Goal: Adheres to Safety Considerations for Self and Others  5/23/2025 1421 by Ajay Joy RN  Outcome: Progressing  5/23/2025 1226 by Ajay Joy RN  Outcome: Progressing   Goal Outcome Evaluation:         Patient had flat affect. Mood was calm. Cooperative with cares. Was visible in the milieu and socialized with peers. Encouraged to participate in group activities. Denied pain, anxiety, depression, covid 19 symptoms, SI/HI/SIB/AH/VH, and contracted for safety. Patient refused to take scheduled Vyvanse. Patient stated medication does not help at this time. Was other scheduled medications. Had good appetite. Will continue to monitor patient.

## 2025-05-23 NOTE — PLAN OF CARE
Problem: Sleep Disturbance  Goal: Adequate Sleep/Rest  Outcome: Progressing   Goal Outcome Evaluation:     NOC 5/22/25-5/23/2025    Focus: Shift summary    Data: Patient slept 7 hours last night. No interventions needed. Respirations even and unlabored on status 15 checks. Will continue to monitor and report to oncoming staff.    Response: Report sleep hours to day shift. Continue to monitor patient and provide therapeutic interventions as necessary.

## 2025-05-23 NOTE — PLAN OF CARE
BEH IP Unit Acuity Rating Score (UARS)  Patient is given one point for every criteria they meet.    CRITERIA SCORING   On a 72 hour hold, court hold, committed, stay of commitment, or revocation. 0   Patient LOS on BEH unit exceeds 20 days. 0  LOS: 6   Patient under guardianship, 55+, otherwise medically complex, or under age 11. 0   Suicide ideation without relief of precipitating factors. 1   Current plan for suicide. 0   Current plan for homicide. 0   Imminent risk or actual attempt to seriously harm another without relief of factors precipitating the attempt. 0   Severe dysfunction in daily living (ex: complete neglect for self care, extreme disruption in vegetative function, extreme deterioration in social interactions). 1   Recent (last 7 days) or current physical aggression in the ED or on unit. 0   Restraints or seclusion episode in past 72 hours. 0   Recent (last 7 days) or current verbal aggression, agitation, yelling, etc., while in the ED or unit. 0   Active psychosis. 0   Need for constant or near constant redirection (from leaving, from others, etc).  0   Intrusive or disruptive behaviors. 0   Patient requires 3 or more hours of individualized nursing care per 8-hour shift (i.e. for ADLs, meds, therapeutic interventions). 0   TOTAL 2

## 2025-05-24 PROCEDURE — 250N000013 HC RX MED GY IP 250 OP 250 PS 637: Performed by: CLINICAL NURSE SPECIALIST

## 2025-05-24 PROCEDURE — 128N000002 HC R&B CD/MH ADOLESCENT

## 2025-05-24 RX ADMIN — ATOMOXETINE 10 MG: 10 CAPSULE ORAL at 08:31

## 2025-05-24 RX ADMIN — NALTREXONE HYDROCHLORIDE 50 MG: 50 TABLET, FILM COATED ORAL at 08:31

## 2025-05-24 RX ADMIN — QUETIAPINE FUMARATE 50 MG: 50 TABLET ORAL at 21:04

## 2025-05-24 RX ADMIN — FLUOXETINE HYDROCHLORIDE 20 MG: 20 CAPSULE ORAL at 08:31

## 2025-05-24 ASSESSMENT — ACTIVITIES OF DAILY LIVING (ADL)
ADLS_ACUITY_SCORE: 25
ORAL_HYGIENE: INDEPENDENT
ADLS_ACUITY_SCORE: 25
DRESS: SCRUBS (BEHAVIORAL HEALTH);INDEPENDENT
ADLS_ACUITY_SCORE: 25
LAUNDRY: WITH SUPERVISION
ADLS_ACUITY_SCORE: 25
HYGIENE/GROOMING: HANDWASHING;INDEPENDENT
ADLS_ACUITY_SCORE: 25

## 2025-05-24 NOTE — PLAN OF CARE
Problem: Sleep Disturbance  Goal: Adequate Sleep/Rest  Outcome: Progressing   Goal Outcome Evaluation:     NOC 5/23/25-5/24/2025    Focus: Shift summary    Data: Patient slept 6.75 hours last night. No interventions needed. Respirations even and unlabored on status 15 checks. Will continue to monitor and report to oncoming staff.    Response: Report sleep hours to day shift. Continue to monitor patient and provide therapeutic interventions as necessary.

## 2025-05-24 NOTE — PLAN OF CARE
"Goal Outcome Evaluation:    Pt is calm and pleasant; affect mainly full range. Her mood is \"content.\" She rates anxiety 1/10, denies other MH sx.She reports eating \"too much,\" when asked re appetite. She reports she is social w peers, and is in and out of the milieu. Pt said she'll attend art grp, if one is held. Pt's goal for the day is \"to practice a wise mind.\" She describes this as being mindful, and not being impulsive. Support given.     Pt has been in the milieu; is in the lounge watching a movie and talking to staff, after lunch. Mom came to visit. She remains in good spirits and denies needs.                              "

## 2025-05-24 NOTE — PLAN OF CARE
Problem: Adult Inpatient Plan of Care  Goal: Plan of Care Review  Description: The Plan of Care Review/Shift note should be completed every shift.  The Outcome Evaluation is a brief statement about your assessment that the patient is improving, declining, or no change.  This information will be displayed automatically on your shiftnote.  Outcome: Progressing  Flowsheets (Taken 5/24/2025 1817)  Plan of Care Reviewed With: patient  Overall Patient Progress: improving     Problem: Adult Behavioral Health Plan of Care  Goal: Adheres to Safety Considerations for Self and Others  Outcome: Progressing     Problem: Suicide Risk  Goal: Absence of Self-Harm  Description: Pt will remain safe on the unit as evidenced by pt identify and utilize 3 coping skills, attend 50% of programming and timely inform staff if not feel safe and/or have difficulty managing emotions or disturbing thoughts   Outcome: Progressing   Goal Outcome Evaluation:      Plan of Care Reviewed With: patient    Overall Patient Progress: improvingOverall Patient Progress: improving        Pt is seen in the milieu,  social with peers, report good mood, calm and co-operative with assessment. Pt spent time at the lounge watching TV and playing cards with peers.  Pt denies SI/SIB/anxiety/hallucinations and pain, reports depression of 2/10, contracts foe safety.  Pt ate 100% dinner at the lounge.  Pt is med compliant, no PRN administered.

## 2025-05-24 NOTE — PLAN OF CARE
Goal Outcome Evaluation:      Plan of Care Reviewed With: patient    Overall Patient Progress: improvingOverall Patient Progress: improving     Pt is seen in the milieu, has bright mood, social with selective peers, calm and co-operative with assessment. Pt spent time at the lounge watching TV.  Pt denies SI/SIB/anxiety/hallucinations and pain, reports depression of 3/10, contracts foe safety.  Pt ate 100% for dinner.  Pt sister visited that went well.  Pt is med compliant, no PRN administered.

## 2025-05-25 PROCEDURE — 250N000013 HC RX MED GY IP 250 OP 250 PS 637: Performed by: CLINICAL NURSE SPECIALIST

## 2025-05-25 PROCEDURE — 97150 GROUP THERAPEUTIC PROCEDURES: CPT | Mod: GO

## 2025-05-25 PROCEDURE — 99231 SBSQ HOSP IP/OBS SF/LOW 25: CPT | Performed by: CLINICAL NURSE SPECIALIST

## 2025-05-25 PROCEDURE — 128N000002 HC R&B CD/MH ADOLESCENT

## 2025-05-25 RX ADMIN — ATOMOXETINE 10 MG: 10 CAPSULE ORAL at 08:15

## 2025-05-25 RX ADMIN — FLUOXETINE HYDROCHLORIDE 20 MG: 20 CAPSULE ORAL at 08:16

## 2025-05-25 RX ADMIN — NALTREXONE HYDROCHLORIDE 50 MG: 50 TABLET, FILM COATED ORAL at 08:16

## 2025-05-25 RX ADMIN — QUETIAPINE FUMARATE 50 MG: 50 TABLET ORAL at 21:20

## 2025-05-25 ASSESSMENT — ACTIVITIES OF DAILY LIVING (ADL)
ADLS_ACUITY_SCORE: 25
DRESS: SCRUBS (BEHAVIORAL HEALTH)
ADLS_ACUITY_SCORE: 25

## 2025-05-25 NOTE — PLAN OF CARE
Goal Outcome Evaluation:    Problem: Adult Inpatient Plan of Care  Goal: Plan of Care Review  Description: The Plan of Care Review/Shift note should be completed every shift.  The Outcome Evaluation is a brief statement about your assessment that the patient is improving, declining, or no change.  This information will be displayed automatically on your shiftnote.  Outcome: Progressing       Pt was met in the hallway   Presented with a flat affect though agreeable to check in   Ate 100% of meals in the lounge   Social with other at times   Watched TV in the lounge     Pt did not report any anxiety or SI thoughts this morning   Pt was medication complaint thee first time  No PRN requested or given   Pt attended group. Had a visitor this afternoon, went well    No behaviors or concerns noted

## 2025-05-25 NOTE — PLAN OF CARE
Problem: Adult Inpatient Plan of Care  Goal: Plan of Care Review  Description: The Plan of Care Review/Shift note should be completed every shift.  The Outcome Evaluation is a brief statement about your assessment that the patient is improving, declining, or no change.  This information will be displayed automatically on your shiftnote.  Outcome: Progressing  Flowsheets (Taken 5/25/2025 1833)  Plan of Care Reviewed With: patient  Overall Patient Progress: improving     Problem: Suicide Risk  Goal: Absence of Self-Harm  Description: Pt will remain safe on the unit as evidenced by pt identify and utilize 3 coping skills, attend 50% of programming and timely inform staff if not feel safe and/or have difficulty managing emotions or disturbing thoughts   Outcome: Progressing  Intervention: Assess Risk to Self and Maintain Safety  Recent Flowsheet Documentation  Taken 5/25/2025 1806 by Ceci Callahan RN  Behavior Management: other (see comments)  Self-Harm Prevention: other (see comments)  Enhanced Safety Measures: other (see comments)  Intervention: Promote Psychosocial Wellbeing  Recent Flowsheet Documentation  Taken 5/25/2025 1806 by Ceci Callahan RN  Supportive Measures:   active listening utilized   positive reinforcement provided   decision-making supported  Sleep/Rest Enhancement: consistent schedule promoted  Intervention: Establish Safety Plan and Continuity of Care  Recent Flowsheet Documentation  Taken 5/25/2025 1806 by Ceci Callahan RN  Safe Transition Promotion: protective factors promoted   Goal Outcome Evaluation:      Plan of Care Reviewed With: patient    Overall Patient Progress: improvingOverall Patient Progress: improving     Pt is seen in the milieu,  social with peers, reports feeling bored because she cannot go outside and enjoy the weather. Pt spent time at the lounge watching TV and playing cards with peers then later she was observed dancing with another peer on the hallway.  Pt  denies SI/SIB/anxiety/hallucinations and pain, reports mild depression that se does not rate , pt contracts for safety.  Pt ate 100% dinner at the lounge.  Pt is med compliant.

## 2025-05-25 NOTE — PLAN OF CARE
Problem: Sleep Disturbance  Goal: Adequate Sleep/Rest  Outcome: Progressing   Goal Outcome Evaluation:     NOC 5/24/25-5/25/2025    Focus: Shift summary    Data: Patient slept 6.5 hours last night. No interventions needed. Respirations even and unlabored on status 15 checks. Will continue to monitor and report to oncoming staff.    Response: Report sleep hours to day shift. Continue to monitor patient and provide therapeutic interventions as necessary.

## 2025-05-25 NOTE — PLAN OF CARE
Rehab Group    Start time: 1110  End time: 1145  Patient time total: 35 minutes    attended full group    #4 attended   Group Type: occupational therapy and general health and coping   Group Topic Covered: cognitive activities, coping skills, healthy leisure time, problem solving, and social skills   Group Session Detail:OT Wellness Group: Patient engaged in a leisure activity with a visuospatial and cognitive component in order to promote: problem solving skills, improve attention/focus, emphasize new learning, exercise cognitive skills/recall, follow directions, symptom management, healthy distraction, social engagement, and foster healthy leisure exploration   Patient Response/Contribution:  cooperative with task, socially appropriate, and actively engaged   Patient Detail:pt calm, pleasant during interactions. Pt agreeable to engage in group cognitive activity. Pt was ind with task following initial instructions and demonstration. Pt able to focus for each round of activity. Pt responses were in context and abundant. 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

## 2025-05-26 PROCEDURE — 90853 GROUP PSYCHOTHERAPY: CPT | Performed by: COUNSELOR

## 2025-05-26 PROCEDURE — 90853 GROUP PSYCHOTHERAPY: CPT

## 2025-05-26 PROCEDURE — 250N000013 HC RX MED GY IP 250 OP 250 PS 637: Performed by: CLINICAL NURSE SPECIALIST

## 2025-05-26 PROCEDURE — 128N000002 HC R&B CD/MH ADOLESCENT

## 2025-05-26 RX ADMIN — QUETIAPINE FUMARATE 50 MG: 50 TABLET ORAL at 21:30

## 2025-05-26 RX ADMIN — NALTREXONE HYDROCHLORIDE 50 MG: 50 TABLET, FILM COATED ORAL at 08:47

## 2025-05-26 RX ADMIN — ATOMOXETINE 10 MG: 10 CAPSULE ORAL at 08:47

## 2025-05-26 RX ADMIN — FLUOXETINE HYDROCHLORIDE 20 MG: 20 CAPSULE ORAL at 08:47

## 2025-05-26 ASSESSMENT — ACTIVITIES OF DAILY LIVING (ADL)
ADLS_ACUITY_SCORE: 25

## 2025-05-26 NOTE — PLAN OF CARE
Group Attendance:  attended full group    Time session began: 1015  Time session ended: 1115  Patient's total time in group: 60    Total # Attendees   6   Group Type psychotherapeutic     Group Topic Covered relationships and mindfulness     Group Session Detail Group members checked in with how they were feeling. Each member chose 2 songs they wanted to listen to. We practiced mindfulness while listening to music.      Patient's response to the group topic/interactions:  positive affect, cooperative with task, organized, supportive of peers, socially appropriate, listened actively, attentive, and actively engaged     Patient Details: Patient was calm and cooperative. She had a bright affect and related well to peers.           30169 - Group psychotherapy - 1 Session  Patient Active Problem List   Diagnosis    Attention deficit hyperactivity disorder (ADHD), combined type    Severe recurrent major depression without psychotic features (H)    Suicidal ideation    Alcohol intoxication delirium    Major depressive disorder, remission status unspecified, unspecified whether recurrent    Alcohol dependence, continuous drinking behavior (H)

## 2025-05-26 NOTE — PLAN OF CARE
Goal Outcome Evaluation:    Plan of Care Reviewed With: patient      Problem: Adult Inpatient Plan of Care  Goal: Plan of Care Review  Description: The Plan of Care Review/Shift note should be completed every shift.  The Outcome Evaluation is a brief statement about your assessment that the patient is improving, declining, or no change.  This information will be displayed automatically on your shiftnote.  Outcome: Progressing    Pt was met in the lounge   Presented as calm with a flat affect   Social at times with others  Ate 100% of meals in the lounge     Denied SI/anxiety at this time   Pt was medication complaint the first time   No PRN requested or given   Paced the hallway with the headphones on     Pt attended group  Had a visitor this afternoon-went well

## 2025-05-26 NOTE — PLAN OF CARE
Group Attendance:  attended full group    Time session began: 1115  Time session ended: 1200  Patient's total time in group: 45    Total # Attendees   4   Group Type psychotherapeutic and psychoeducational     Group Topic Covered relationships and boundaries     Group Session Detail Patient attended a psycho-education group on the topic of boundaries. Patients learned the definition of a boundary, the areas of personal responsibility, how to identify healthy and unhealthy boundaries and how to change unhealthy boundaries into healthy boundaries. Patients worked together on how to set boundaries in various scenarios..      Patient's response to the group topic/interactions:  positive affect, cooperative with task, organized, attentive, and actively engaged     Patient Details: Patient fully engaged in the discussion and shared personal examples from her own life related to the topic. She demonstrated insight into her own issues with boundaries.           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

## 2025-05-26 NOTE — PLAN OF CARE
Problem: Sleep Disturbance  Goal: Adequate Sleep/Rest  Outcome: Progressing   Goal Outcome Evaluation:     NOC 5/25/25-5/26/2025    Focus: Shift summary    Data: Patient slept 7 hours last night. No interventions needed. Respirations even and unlabored on status 15 checks. Will continue to monitor and report to oncoming staff.    Response: Report sleep hours to day shift. Continue to monitor patient and provide therapeutic interventions as necessary.

## 2025-05-26 NOTE — PROGRESS NOTES
"[facility], [place]   Psychiatric Progress Note  Hospital Day: 8        Interim History:   The patient's care was discussed with the treatment team during the daily team meeting and/or staff's chart notes were reviewed.  Staff report patient slept  6.5 hours last night.  Out in the hallway, socialize with peers, watched TV in the lounge.  Patient did not report any anxiety or anxiety throughout this morning.  She was medication compliant.    Provider is on-call this weekend, decided to check in with the patient and see how things is going with her.  Upon interview, the patient was interviewed in her room in stations 6A.  Patient reports her mood as good, affect remains flat to neutral.  Patient reports that she was up couple times last night, not been able to sleep because of 15-minute safety checks.  She reports that she has noticed some changes in her symptoms which she states was better.  Patient does not know what brought about this changes, whether it was medication or abstinence from alcohol during this hospitalization.  She believes her medications are working and she has not experienced side effects, other than some occasional tiredness which she also attributes to her lack of physical activities in the hospital. Patient reports she no longer experiences nightmares, able to tolerate being around people than before.  Patient reports a little bit depressed.  She expresses boredom in the hospital.  Patient again reiterated that she does not know whether the changes was brought about by medication or lack of alcohol.  Patient states \"I am an alcoholic, I drink a lot but everything is kind of changed for better.\"  Patient also referred to herself as a binge eater.  She wonders why naltrexone has not made her to completely not crave for alcohol.  She doubted that naltrexone blocks the appropriate receptor and the reward center in the brain.  After educating the patient that in addition " to the role of naltrexone blocking the reward center, she has to be determined to embrace a change.  She acknowledged that she is not really craving alcohol, or states that when she is out of the hospital, she plans to reduce intake of alcohol.  She his states that if she promised not to drink alcohol at all, that she is not being honest.  Patient then referred to have binge eating habits, the most of the time, she does not feel like eating but she forces herself to binge eat.  She believes the same principle applies to alcohol binge drinking.    Provider asked the patient about her motivation and readiness to change on a scale of 1-10, patient states that she is at 8.  The plan for discharge is Wednesday.  Provider encouraged the patient to go for a residential treatment rather than outpatient program but ultimately the decision is hers to make.  Patient has been accepted to a [facility] program this coming Thursday.  Patient was wondering if her naltrexone could be increased.  Patient denied auditory/visual hallucinations, suicidal/homicidal ideations and thoughts of self-harm.    Suicidal ideation: denies current or recent suicidal ideation or behaviors.    Homicidal ideation: denies current or recent homicidal ideation or behaviors.    Psychotic symptoms: Odd patient denies AH, VH, paranoia, delusions.     Medication side effects reported: No significant side effects.    Acute medical concerns: none    Other issues reported by patient:  Patient had no further questions or concerns.           Medications:     Current Facility-Administered Medications   Medication Dose Route Frequency Provider Last Rate Last Admin    atomoxetine (STRATTERA) capsule 10 mg  10 mg Oral Daily Hunter Liu APRN CNP   10 mg at 05/25/25 0815    FLUoxetine (PROzac) capsule 20 mg  20 mg Oral Daily Hunter Liu APRN CNP   20 mg at 05/25/25 0816    naltrexone (DEPADE/REVIA) tablet 50 mg  50 mg Oral Daily Hunter Liu APRN CNP   50 mg  "at 05/25/25 0816    QUEtiapine (SEROquel) tablet 50 mg  50 mg Oral At Bedtime Hunter Liu APRN CNP   50 mg at 05/25/25 2120          Allergies:   No Known Allergies       Labs:     No results found for this or any previous visit (from the past 24 hours).         Psychiatric Examination:     /80 (BP Location: Right arm, Patient Position: Sitting, Cuff Size: Adult Regular)   Pulse 79   Temp 97.6  F (36.4  C) (Oral)   Resp 18   Ht 1.6 m (5' 3\")   Wt 46.6 kg (102 lb 11.2 oz)   SpO2 100%   BMI 18.19 kg/m    Weight is 102 lbs 11.2 oz  Body mass index is 18.19 kg/m .    Weight over time:  Vitals:    05/16/25 2223 05/17/25 1100 05/20/25 0900   Weight: 45.1 kg (99 lb 8 oz) 44.2 kg (97 lb 8 oz) 46.6 kg (102 lb 11.2 oz)       Orthostatic Vitals       None              Cardiometabolic risk assessment. 05/20/25      Reviewed patient profile for cardiometabolic risk factors    Date taken /Value  REFERENCE RANGE   Abdominal Obesity  (Waist Circumference)   See nursing flowsheet Women >=35 in (88 cm)   Men >=40 in (102 cm)      Triglycerides  Triglycerides   Date Value Ref Range Status   05/18/2025 43 <150 mg/dL Final       >=150 mg/dL (1.7 mmol/L) or current treatment for elevated triglycerides   HDL cholesterol  Direct Measure HDL   Date Value Ref Range Status   05/18/2025 150 >=50 mg/dL Final   ]   Women <50 mg/dL (1.3 mmol/L) in women or current treatment for low HDL cholesterol  Men <40 mg/dL (1 mmol/L) in men or current treatment for low HDL cholesterol     Fasting plasma glucose (FPG) Lab Results   Component Value Date     04/24/2025    GLC 76 04/24/2025      FPG >=100 mg/dL (5.6 mmol/L) or treatment for elevated blood glucose   Blood pressure  BP Readings from Last 3 Encounters:   05/25/25 129/80   04/25/25 98/72   06/11/23 (!) 140/84    Blood pressure >=130/85 mmHg or treatment for elevated blood pressure   Family History  See family history     Mental Status Exam:  Appearance: awake, alert, " "adequately groomed, dressed in hospital scrubs, and appeared as age stated  Attitude:  cooperative  Eye Contact:  good  Mood:  \"Good\"  Affect:  intensity is blunted and intensity is flat  Speech:  clear, coherent  Language: fluent and intact in English  Psychomotor, Gait, Musculoskeletal:  no evidence of tardive dyskinesia, dystonia, or tics  Throught Process:  goal oriented  Associations:  no loose associations  Thought Content:  Denies auditory/visual hallucination, suicidal/homicidal ideation and thoughts of self-harm  Insight:  fair  Judgement: Fair  Oriented to:  time, person, and place  Attention Span and Concentration:  intact  Recent and Remote Memory:  fair  Fund of Knowledge:  appropriate           Precautions:     Behavioral Orders   Procedures    Code 1 - Restrict to Unit    [facility] Extended [facility]     Until discharge, Extended Care to offer psychotherapeutic services to mental health patients boarding for admission or stabilization. These services are to include but are not limited to: individual psychotherapy, diagnostic assessment, case management and care planning, safety planning, etc. This may include up to 1 visit per day. If patient is physically located at [facility] or [facility], group psychotherapy up to 2 time per day may be offered.     Routine Programming     As clinically indicated    Self Injury Precaution    Status 15     Every 15 minutes.    Suicide precautions: Suicide Risk: MODERATE; Clinical rationale to override score: lack of access to a plan for self-harm     Patients on Suicide Precautions should have a Combination Diet ordered that includes a Diet selection(s) AND a Behavioral Tray selection for Safe Tray - with utensils, or Safe Tray - NO utensils       Suicide Risk:   MODERATE     Clinical rationale to override score::   lack of access to a plan for self-harm    Withdrawal precautions          Diagnoses:   Severe recurrent major depression without psychotic features  Attention deficit " hyperactivity disorder (ADHD), combined type  Alcohol intoxication delirium  Alcohol dependence, continuous drinking behavior (H)      Clinically Significant Risk Factors                                    # Financial/Environmental Concerns: none                     Assessment & Plan:     Assessment and hospital summary:  This patient is a 24 year old female with history of Depression who presented to ED with SI  in context of alcohol intoxication. Symptoms and presentation at this time is most consistent with MDD. I have discussed the risks, benefits, and alternatives of medications. Patient is amenable to a trial. Patient will likely benefit from a residential treatment upon discharge.     5/25/2025: Patient has not taking Vyvanse since admission.  She felt she does not need it as she is not involved in any activity during this hospitalization.  Patient believes that taking it will amount to a waste.  Today she requested for her ADHD medication Strattera, she does not remember how much she was taking before, but remembers it has been over a week that she took the medication. With the Pharm.D. support that is reasonable to start her on 10 mg.  Patient has been started on the Strattera were Vyvanse has been discontinued.        Today's Changes:  Strattera capsule 10 mg oral daily  Discontinue Vyvanse capsule 40 mg oral daily.  (Patient has not been taking since admission)    Target psychiatric symptoms and interventions  Strattera 10 mg oral daily  Naltrexone (DEPADE/Revia) tablet 50 mg oral daily  Seroquel 50 mg at bedtime  Fluoxetine 20 mg oral daily  Risks, benefits, and alternatives discussed at length with patient.     Acute Medical Problems and Treatments:  Acute medical concerns:  - No acute medical concerns    Pertinent labs/imaging:  UDS negative, HCG negative, Alcohol breath 0.294 (high).    Behavioral/Psychological/Social:  - Encourage unit programming    Safety:  - Continue precautions as noted above  -  Status 15 minute checks      Legal Status: voluntary    Disposition Plan   Reason for ongoing admission: is unable to care for self due to severe psychosis or aurelio and requires detoxification from substance that poses a risk of bodily harm during withdrawal period  Discharge location: Plans to return to [facility]  Discharge Medications: not ordered  Follow-up Appointments: not scheduled    Entered by: PHUONG Stokes CNP on 05/25/2025  at 11:07 PM

## 2025-05-27 PROCEDURE — 250N000013 HC RX MED GY IP 250 OP 250 PS 637: Performed by: CLINICAL NURSE SPECIALIST

## 2025-05-27 PROCEDURE — 99231 SBSQ HOSP IP/OBS SF/LOW 25: CPT | Performed by: CLINICAL NURSE SPECIALIST

## 2025-05-27 PROCEDURE — H2032 ACTIVITY THERAPY, PER 15 MIN: HCPCS

## 2025-05-27 PROCEDURE — 97150 GROUP THERAPEUTIC PROCEDURES: CPT | Mod: GO

## 2025-05-27 PROCEDURE — 128N000002 HC R&B CD/MH ADOLESCENT

## 2025-05-27 RX ADMIN — ATOMOXETINE 10 MG: 10 CAPSULE ORAL at 08:36

## 2025-05-27 RX ADMIN — FLUOXETINE HYDROCHLORIDE 20 MG: 20 CAPSULE ORAL at 08:36

## 2025-05-27 RX ADMIN — NALTREXONE HYDROCHLORIDE 50 MG: 50 TABLET, FILM COATED ORAL at 08:36

## 2025-05-27 RX ADMIN — QUETIAPINE FUMARATE 50 MG: 50 TABLET ORAL at 21:23

## 2025-05-27 ASSESSMENT — ACTIVITIES OF DAILY LIVING (ADL)
ADLS_ACUITY_SCORE: 25
ADLS_ACUITY_SCORE: 25
ORAL_HYGIENE: INDEPENDENT
ADLS_ACUITY_SCORE: 25
HYGIENE/GROOMING: INDEPENDENT
ADLS_ACUITY_SCORE: 25
LAUNDRY: WITH SUPERVISION
ADLS_ACUITY_SCORE: 25
DRESS: SCRUBS (BEHAVIORAL HEALTH)
ADLS_ACUITY_SCORE: 25
ADLS_ACUITY_SCORE: 25

## 2025-05-27 NOTE — PLAN OF CARE
Goal Outcome Evaluation:      Group Attendance:  attended full group    Time session began: 8:15 pm  Time session ended: 9:00 pm  Patient's total time in group: 45    Total # Attendees   5   Group Type psychotherapeutic     Group Topic Covered insight improvement and symptom management     Group Session Detail Process     Patient's response to the group topic/interactions:  cooperative with task, listened actively, attentive, and needed prompts to redirect     Patient Details: Some boundary issues with conversation, venting about lack of rights and also how her sister was asking questions about other pts. We talked about HIPAA privacy rules           [id number] - Group psychotherapy - 1 Session  Patient Active Problem List   Diagnosis    Attention deficit hyperactivity disorder (ADHD), combined type    Severe recurrent major depression without psychotic features (H)    Suicidal ideation    Alcohol intoxication delirium    Major depressive disorder, remission status unspecified, unspecified whether recurrent    Alcohol dependence, continuous drinking behavior (H)

## 2025-05-27 NOTE — PROGRESS NOTES
05/27/25 1508   Individualization/Patient Specific Goals   Patient Vulnerabilities history of unsuccessful treatment;lacks insight into illness;substance abuse/addiction   Interprofessional Rounds   Summary There was discussion about timeline for discharge   Participants advanced practice nurse;CTC;nursing;social work   Behavioral Team Discussion   Participants PHUONG Conroy; Izabel Jasso RN; [facility]; Alexandra Noel [facility]   Progress [facility]. Recent admisstion   Anticipated length of stay 5 to 6 days   Continued Stay Criteria/Rationale SI and Alcohol intox   Medical/Physical See H&P   Precautions See below   Plan Stablize and discharge with outpatient supports   Rationale for change in precautions or plan Intial plan   Anticipated Discharge Disposition home with family     PRECAUTIONS AND SAFETY    Behavioral Orders   Procedures    Code 1 - Restrict to Unit    [facility] Extended [facility]     Until discharge, Extended Care to offer psychotherapeutic services to mental health patients boarding for admission or stabilization. These services are to include but are not limited to: individual psychotherapy, diagnostic assessment, case management and care planning, safety planning, etc. This may include up to 1 visit per day. If patient is physically located at [facility] or [facility], group psychotherapy up to 2 time per day may be offered.     Routine Programming     As clinically indicated    Self Injury Precaution    Status 15     Every 15 minutes.    Suicide precautions: Suicide Risk: MODERATE; Clinical rationale to override score: lack of access to a plan for self-harm     Patients on Suicide Precautions should have a Combination Diet ordered that includes a Diet selection(s) AND a Behavioral Tray selection for Safe Tray - with utensils, or Safe Tray - NO utensils       Suicide Risk:   MODERATE     Clinical rationale to override score::   lack of access to a plan for self-harm    Withdrawal precautions        Safety  Safety WDL: WDL  [place] [facility]  Observed Behavior: calm  Observed Behavior (Comment): watching television  Safety Measures: safety rounds completed  Diversional Activity: music  Suicidality: Status 15  Withdrawal: monitor withdrawal process  Additional Documentation:  (also on sib prec)

## 2025-05-27 NOTE — PLAN OF CARE
BEH IP Unit Acuity Rating Score (UARS)  Patient is given one point for every criteria they meet.    CRITERIA SCORING   On a 72 hour hold, court hold, committed, stay of commitment, or revocation. 0   Patient LOS on BEH unit exceeds 20 days. 0  LOS: 10   Patient under guardianship, 55+, otherwise medically complex, or under age 11. 0   Suicide ideation without relief of precipitating factors. 1   Current plan for suicide. 0   Current plan for homicide. 0   Imminent risk or actual attempt to seriously harm another without relief of factors precipitating the attempt. 0   Severe dysfunction in daily living (ex: complete neglect for self care, extreme disruption in vegetative function, extreme deterioration in social interactions). 1   Recent (last 7 days) or current physical aggression in the ED or on unit. 0   Restraints or seclusion episode in past 72 hours. 0   Recent (last 7 days) or current verbal aggression, agitation, yelling, etc., while in the ED or unit. 0   Active psychosis. 0   Need for constant or near constant redirection (from leaving, from others, etc).  0   Intrusive or disruptive behaviors. 0   Patient requires 3 or more hours of individualized nursing care per 8-hour shift (i.e. for ADLs, meds, therapeutic interventions). 0   TOTAL 2

## 2025-05-27 NOTE — PLAN OF CARE
Rehab Group    Start time: 1015  End time: 1105  Patient time total: 45 minutes    attended full group    #5 attended   Group Type: occupational therapy   Group Topic Covered: balanced lifestyle, cognitive activities, coping skills, healthy leisure time, problem solving, and social skills       Group Session Detail:  OT CLINIC     Patient Response/Contribution:  cooperative with task, organized, socially appropriate, and needed one cue for redirection     Patient Detail:    Occupational therapy clinic to facilitate coping skill exploration, creative expression within personally meaningful activities, and clinical observation of social, cognitive, and kinesthetic performance skills. Pt response: Pt presented with bright affect, elevated mood. IND to initiate, gather materials, sequence, and adjust to workspace demands as needed for a familiar, structured task. IND With all performance skills. Demonstrated good focus and attention to detail for task. Able to ask for assistance and appeared comfortable with peers and staff. Pt did need to be redirected x1 for appropriate commentary.       [id number] OT Group (2 or more in attendance)      Patient Active Problem List   Diagnosis    Attention deficit hyperactivity disorder (ADHD), combined type    Severe recurrent major depression without psychotic features (H)    Suicidal ideation    Alcohol intoxication delirium    Major depressive disorder, remission status unspecified, unspecified whether recurrent    Alcohol dependence, continuous drinking behavior (H)

## 2025-05-27 NOTE — PROGRESS NOTES
"  Rehab Group    Start time: 1115  End time: 1215  Patient time total: 30 minutes    attended partial group    #4 attended   Group Type: art   Group Topic Covered: activity therapy       Group Session Detail:  Art Therapy directive was to create art work in response to a self-compassion/self care prompt, \"Cup of Self Compassion.\"  Goals of directive: to identify current, past and future self care activities/self compassion activities.   Goals of directive: identifying personal strengths and goals, emotional expression, identifying coping skills, sensory item exploration, emotional regulation     Patient Response/Contribution:  cooperative with task       Patient Detail:    Pt was an engaged participant, focused on task for the full time that pt attended group. Pt finished artwork and briefly verbally processed with author and group. Pt identified things important to her in her life including being in nature, taking care of animals and exercise.  Pts mood was calm.      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

## 2025-05-27 NOTE — PLAN OF CARE
Problem: Depressive Symptoms  Goal: Depressive Symptoms  Description: Signs and symptoms of listed problems will be absent or manageable.  Outcome: Progressing  Goal: Social and Therapeutic (Depression)  Description: Signs and symptoms of listed problems will be absent or manageable.  Outcome: Progressing     Problem: Psychotic Symptoms  Goal: Psychotic Symptoms  Description: Signs and symptoms of listed problems will be absent or manageable.  Outcome: Progressing   Goal Outcome Evaluation:  Pt was observed pacing in the hallway, coloring in the lounge area, social with peers in the milieu. Pt described her feeling content at the moment. She was pleasant and cooperative with staff. VS stable. Affect is flat.    Pt checked in as doing okay, rated anxiety at 2 and depression at 3 with 10 being worst. Pt rated overall mood at calm and good.  Pt denies SI/SIB/HI. Denies visual and auditory hallucinations. Pt reports okay appetite, ate about 100% of dinner. Pt's goal for today was to eat more veggies and fruits for health intake. Pt requested and received no PRN. Pt was medication compliant. Pt denied , medication side effects and medical concerns.    Update:  Pt is tentatively scheduled for discharge on Wednesday May 28,2025.    Vital signs:  Temp: 97.8  F (36.6  C) Temp src: Oral BP: 117/81 Pulse: 76     SpO2: 100 % O2 Device: None (Room air)

## 2025-05-27 NOTE — PLAN OF CARE
Rehab Group    Start time: 1400  End time: 1440  Patient time total: 35 minutes    attended full group    #4 attended   Group Type: occupational therapy   Group Topic Covered: cognitive activities, coping skills, and healthy leisure time       Group Session Detail:  Cognitive Leisure Task     Patient Response/Contribution:  cooperative with task, organized, supportive of peers, expressed understanding of topic, and actively engaged       Patient Detail:    Pt actively participated in a structured occupational therapy group with a focus on a visual-spatial leisure task. Pt was able to follow 2-step directions of the novel task, and demonstrated strategic planning and problem solving throughout the task. Pt remained focused and engaged for the full duration of group. Observed actively planning ahead and tracking the task consistently.         [id number] OT Group (2 or more in attendance)      Patient Active Problem List   Diagnosis    Attention deficit hyperactivity disorder (ADHD), combined type    Severe recurrent major depression without psychotic features (H)    Suicidal ideation    Alcohol intoxication delirium    Major depressive disorder, remission status unspecified, unspecified whether recurrent    Alcohol dependence, continuous drinking behavior (H)

## 2025-05-27 NOTE — PLAN OF CARE
Problem: Suicide Risk  Goal: Absence of Self-Harm  Description: Pt will remain safe on the unit as evidenced by pt identify and utilize 3 coping skills, attend 50% of programming and timely inform staff if not feel safe and/or have difficulty managing emotions or disturbing thoughts   Outcome: Progressing   Goal Outcome Evaluation:     Plan of Care Reviewed With: patient       Pt ate breakfast and was med complaint, She said she always has low level pain, and denied intervention. She denied anxiety, depression, SI, HI, and hallucinations. She watched television, attended morning OT. Pt ate lunch, attended afternoon OT.

## 2025-05-27 NOTE — PLAN OF CARE
Group Attendance:  attended full group    Time session began: 1300  Time session ended: 1350  Patient's total time in group: 50    Total # Attendees   5   Group Type psychotherapeutic, psychoeducational, and CBT     Group Topic Covered healthy coping skills and CBT skills       Group Session Detail Group topic: the Cognitive Model. The relationship between thoughts, emotions and behaviors was discussed. Participants discussed several scenarios and ways to change irrational thoughts into positive and rational thoughts. They also discussed the new emotion and behavior that came from positive, rational thoughts. Cognitive distortions were discussed and patients identified those they struggle with and how to overcome them.      Patient's response to the group topic/interactions:  positive affect, cooperative with task, organized, supportive of peers, socially appropriate, attentive, and actively engaged     Patient Details: Patient was a positive participant and fully engaged in the discussion. Patient was able to identify her won cognitive distortions. She gave good responses to the scenarios.           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

## 2025-05-27 NOTE — PLAN OF CARE
"  Problem: Adult Inpatient Plan of Care  Goal: Plan of Care Review  Description: The Plan of Care Review/Shift note should be completed every shift.  The Outcome Evaluation is a brief statement about your assessment that the patient is improving, declining, or no change.  This information will be displayed automatically on your shiftnote.  Outcome: Progressing  Flowsheets (Taken 5/26/2025 2103)  Plan of Care Reviewed With: patient     Problem: Suicide Risk  Goal: Absence of Self-Harm  Description: Pt will remain safe on the unit as evidenced by pt identify and utilize 3 coping skills, attend 50% of programming and timely inform staff if not feel safe and/or have difficulty managing emotions or disturbing thoughts   Outcome: Progressing  Intervention: Assess Risk to Self and Maintain Safety  Recent Flowsheet Documentation  Taken 5/26/2025 2102 by Ceci Callahan RN  Behavior Management: other (see comments)  Self-Harm Prevention: other (see comments)  Intervention: Promote Psychosocial Wellbeing  Recent Flowsheet Documentation  Taken 5/26/2025 2102 by Ceci Callahan RN  Supportive Measures:   active listening utilized   positive reinforcement provided   decision-making supported  Sleep/Rest Enhancement: consistent schedule promoted  Family/Support System Care: involvement promoted  Intervention: Establish Safety Plan and Continuity of Care  Recent Flowsheet Documentation  Taken 5/26/2025 2102 by Ceci Callahan RN  Safe Transition Promotion: protective factors promoted   Goal Outcome Evaluation:      Plan of Care Reviewed With: patient   Pt is seen in the milieu,  social with peers, pt spent time at the lounge watching TV and playing cards with peers.  Pt denies SI/SIB/anxiety/hallucinations/ depression and pain, pt contracts for safety.  Pt ate 100% dinner at the lounge.  Pt is med compliant   At around 2220, writer approached pt and requested her to switch room to POD 2, but she refused and said \" this is " "my room, I do not want to switch, I did not do anything, and I do not see what she did wrong she just came to talk to me, It just seems to be funny.\"  Writer asked pt what seems to be funny, Jennifer explained the scenario of the peer from 612 who came to her door naked, pt reminded that it was inappropriate, they should not go to other peer's room, Jennifer was agreeable. Staff will continue to monitor.  "

## 2025-05-27 NOTE — PLAN OF CARE
"Team Note Due:  Monday    Assessment/Intervention/Current Symtoms and Care Coordination:  Chart review and met with team, discussed pt progress, symptomology, and response to treatment.  Discussed the discharge plan and any potential impediments to discharge.    CTC spoke with [facility] and they do not accept patient's insurance.    CTC spoke with patient and she agreed to PHP program at [facility]. Provider put in referral for the program.     CTC spoke with Mom, Sarah, about discharge plan to demit sometime mid week pending admission to PHP program. Sarah was concerned about PHP program and diagnostic assessment for it, CTC emailed mom information for PHP program.     CTC and patient spoke and patient expressed a strong preference in returning to [facility]. Patient asked if CTC could look into using secondary insurance at [facility] because patient feels she has, \"unfinished business,\" at [facility] and would like to complete their program even if it requires starting over. Patient expressed frustrations at starting all over with a new program and new providers.    Discharge Plan or Goal:  Dispo Plan: Pending stabilization. Consider return home with PHP.   Discharge Date/ time: TBD  Transportation:   AVS Completed: Yes [] No[]  Provisional Discharge: Yes[] No[]       Barriers to Discharge:  Patient requires further psychiatric stabilization due to current symptomology of SI as well as medication management.        Referral Status:  [facility] PHP; pt wants psychiatry.      Legal Status:  72 hour hold starting 5/17/25 at 604 - but voluntary as of 5/19 @5565  County: [place]  File Number:   Start and expiration date of commitment:     Contacts (include JOSHUA status):  JOSHUA for Pt's mom   Sarah Briggs [phone]      Upcoming Meetings and Dates/Important Information and next steps:  CTC Follow Up with [facility] program regarding intake     "

## 2025-05-27 NOTE — PLAN OF CARE
Problem: Sleep Disturbance  Goal: Adequate Sleep/Rest  Outcome: Progressing   Goal Outcome Evaluation:     NOC 5/26/25-5/27/2025    Focus: Shift summary    Data: Patient slept 6.25 hours last night. No interventions needed. Respirations even and unlabored on status 15 checks. Will continue to monitor and report to oncoming staff.    Response: Report sleep hours to day shift. Continue to monitor patient and provide therapeutic interventions as necessary.

## 2025-05-28 ENCOUNTER — TELEPHONE (OUTPATIENT)
Dept: BEHAVIORAL HEALTH | Facility: CLINIC | Age: 25
End: 2025-05-28
Payer: COMMERCIAL

## 2025-05-28 PROCEDURE — 250N000013 HC RX MED GY IP 250 OP 250 PS 637: Performed by: EMERGENCY MEDICINE

## 2025-05-28 PROCEDURE — 97150 GROUP THERAPEUTIC PROCEDURES: CPT | Mod: GO

## 2025-05-28 PROCEDURE — 250N000013 HC RX MED GY IP 250 OP 250 PS 637: Performed by: CLINICAL NURSE SPECIALIST

## 2025-05-28 PROCEDURE — H2032 ACTIVITY THERAPY, PER 15 MIN: HCPCS

## 2025-05-28 PROCEDURE — 128N000002 HC R&B CD/MH ADOLESCENT

## 2025-05-28 PROCEDURE — 99231 SBSQ HOSP IP/OBS SF/LOW 25: CPT | Performed by: CLINICAL NURSE SPECIALIST

## 2025-05-28 RX ADMIN — HYDROXYZINE HYDROCHLORIDE 25 MG: 25 TABLET, FILM COATED ORAL at 17:49

## 2025-05-28 RX ADMIN — NALTREXONE HYDROCHLORIDE 50 MG: 50 TABLET, FILM COATED ORAL at 08:40

## 2025-05-28 RX ADMIN — QUETIAPINE FUMARATE 50 MG: 50 TABLET ORAL at 21:53

## 2025-05-28 RX ADMIN — FLUOXETINE HYDROCHLORIDE 20 MG: 20 CAPSULE ORAL at 08:40

## 2025-05-28 RX ADMIN — ATOMOXETINE 10 MG: 10 CAPSULE ORAL at 08:40

## 2025-05-28 ASSESSMENT — ACTIVITIES OF DAILY LIVING (ADL)
ADLS_ACUITY_SCORE: 25
DRESS: SCRUBS (BEHAVIORAL HEALTH)
ADLS_ACUITY_SCORE: 25
HYGIENE/GROOMING: INDEPENDENT
ADLS_ACUITY_SCORE: 25
ORAL_HYGIENE: INDEPENDENT
ADLS_ACUITY_SCORE: 25

## 2025-05-28 NOTE — TELEPHONE ENCOUNTER
Called 6A [phone] transferred to [facility] Domonique Tyson [phone].    Domonique said discharge as soon as DA is done. Informed soonest availability is tomorrow at 1:30pm. Agreed to schedule. Domonique said to arrange IPAD with them and call them directly.     Linda Ayoub  Transition Clinic Coordinator  05/28/25 8:59 AM

## 2025-05-28 NOTE — PROGRESS NOTES
"[facility], [place]   Psychiatric Progress Note  Hospital Day: 11        Interim History:   The patient's care was discussed with the treatment team during the daily team meeting and/or staff's chart notes were reviewed.  Staff report patient slept 6.25 hours last night.  Pt checked in as doing okay, rated anxiety at 2 and depression at 3 with 10 being worst. Pt rated overall mood at calm and good.  Pt denies SI/SIB/HI. Denies visual and auditory hallucinations   Pt requested and received no PRN. Pt was medication compliant. Pt denied , medication side effects and medical concerns.     Upon interview, the patient was interviewed in her room in stations 6A.  Patient reports her mood as okay. Patient was about to start a group before this assessment, states  \"I want to do this group.\" Patient reports that the PHP assessment was yet to be done. Reassured that the CTC would follow up with them and let her know when they would be available for the assessment. Later CTC informed that they would do it tomorrow at 11 am. Patient reports nothing has changed from yesterday. Patient reports that [facility] is still her priority. She rated her anxiety as 2/10 and depression as 3/10.  She denied auditory/visual hallucinations, suicidal/homicidal ideations and thoughts of self-harm.           Medications:     Current Facility-Administered Medications   Medication Dose Route Frequency Provider Last Rate Last Admin    atomoxetine (STRATTERA) capsule 10 mg  10 mg Oral Daily Hunter Liu APRN CNP   10 mg at 05/27/25 0836    FLUoxetine (PROzac) capsule 20 mg  20 mg Oral Daily Hunter Liu APRN CNP   20 mg at 05/27/25 0836    naltrexone (DEPADE/REVIA) tablet 50 mg  50 mg Oral Daily Hunter Liu APRN CNP   50 mg at 05/27/25 0836    QUEtiapine (SEROquel) tablet 50 mg  50 mg Oral At Bedtime Hunter Liu APRN CNP   50 mg at 05/27/25 2123          Allergies:   No Known Allergies       Labs: " "    No results found for this or any previous visit (from the past 24 hours).         Psychiatric Examination:     /81 (Patient Position: Sitting, Cuff Size: Adult Small)   Pulse 76   Temp 97.8  F (36.6  C) (Oral)   Resp 18   Ht 1.6 m (5' 3\")   Wt 46.6 kg (102 lb 11.2 oz)   SpO2 100%   BMI 18.19 kg/m    Weight is 102 lbs 11.2 oz  Body mass index is 18.19 kg/m .    Weight over time:  Vitals:    05/16/25 2223 05/17/25 1100 05/20/25 0900   Weight: 45.1 kg (99 lb 8 oz) 44.2 kg (97 lb 8 oz) 46.6 kg (102 lb 11.2 oz)       Orthostatic Vitals       None              Cardiometabolic risk assessment. 05/20/25      Reviewed patient profile for cardiometabolic risk factors    Date taken /Value  REFERENCE RANGE   Abdominal Obesity  (Waist Circumference)   See nursing flowsheet Women >=35 in (88 cm)   Men >=40 in (102 cm)      Triglycerides  Triglycerides   Date Value Ref Range Status   05/18/2025 43 <150 mg/dL Final       >=150 mg/dL (1.7 mmol/L) or current treatment for elevated triglycerides   HDL cholesterol  Direct Measure HDL   Date Value Ref Range Status   05/18/2025 150 >=50 mg/dL Final   ]   Women <50 mg/dL (1.3 mmol/L) in women or current treatment for low HDL cholesterol  Men <40 mg/dL (1 mmol/L) in men or current treatment for low HDL cholesterol     Fasting plasma glucose (FPG) Lab Results   Component Value Date     04/24/2025    GLC 76 04/24/2025      FPG >=100 mg/dL (5.6 mmol/L) or treatment for elevated blood glucose   Blood pressure  BP Readings from Last 3 Encounters:   05/27/25 117/81   04/25/25 98/72   06/11/23 (!) 140/84    Blood pressure >=130/85 mmHg or treatment for elevated blood pressure   Family History  See family history     Mental Status Exam:  Appearance: awake, alert, adequately groomed, dressed in hospital scrubs, and appeared as age stated  Attitude:  cooperative  Eye Contact:  good  Mood: \"Okay\"  Affect: Flat, neutral  Speech:  clear, coherent  Language: fluent and intact in " English  Psychomotor, Gait, Musculoskeletal:  no evidence of tardive dyskinesia, dystonia, or tics  Throught Process:  goal oriented  Associations:  no loose associations  Thought Content:  Denies auditory/visual hallucination, suicidal/homicidal ideation and thoughts of self-harm  Insight:  fair  Judgement: Fair  Oriented to:  time, person, and place  Attention Span and Concentration:  intact  Recent and Remote Memory:  fair  Fund of Knowledge:  appropriate           Precautions:     Behavioral Orders   Procedures    Code 1 - Restrict to Unit    [facility] Extended Care     Until discharge, Extended Care to offer psychotherapeutic services to mental health patients boarding for admission or stabilization. These services are to include but are not limited to: individual psychotherapy, diagnostic assessment, case management and care planning, safety planning, etc. This may include up to 1 visit per day. If patient is physically located at [facility] or [facility], group psychotherapy up to 2 time per day may be offered.     Routine Programming     As clinically indicated    Self Injury Precaution    Status 15     Every 15 minutes.    Suicide precautions: Suicide Risk: MODERATE; Clinical rationale to override score: lack of access to a plan for self-harm     Patients on Suicide Precautions should have a Combination Diet ordered that includes a Diet selection(s) AND a Behavioral Tray selection for Safe Tray - with utensils, or Safe Tray - NO utensils       Suicide Risk:   MODERATE     Clinical rationale to override score::   lack of access to a plan for self-harm    Withdrawal precautions          Diagnoses:   Severe recurrent major depression without psychotic features  Attention deficit hyperactivity disorder (ADHD), combined type  Alcohol intoxication delirium  Alcohol dependence, continuous drinking behavior (H)      Clinically Significant Risk Factors                                    # Financial/Environmental Concerns: none                      Assessment & Plan:     Assessment and hospital summary:  This patient is a 24 year old female with history of Depression who presented to ED with SI  in context of alcohol intoxication. Symptoms and presentation at this time is most consistent with MDD. I have discussed the risks, benefits, and alternatives of medications. Patient is amenable to a trial. Patient will likely benefit from a residential treatment upon discharge.     5/25/2025: Patient has not taking Vyvanse since admission.  She felt she does not need it as she is not involved in any activity during this hospitalization.  Patient believes that taking it will amount to a waste.  Today she requested for her ADHD medication Strattera, she does not remember how much she was taking before, but remembers it has been over a week that she took the medication. With the Pharm.D. support that is reasonable to start her on 10 mg.  Patient has been started on the Strattera were Vyvanse has been discontinued.        Today's Changes:  No medication changes      Target psychiatric symptoms and interventions  Strattera 10 mg oral daily  Naltrexone (DEPADE/Revia) tablet 50 mg oral daily  Seroquel 50 mg at bedtime  Fluoxetine 20 mg oral daily  Risks, benefits, and alternatives discussed at length with patient.     Acute Medical Problems and Treatments:  Acute medical concerns:  - No acute medical concerns    Pertinent labs/imaging:  UDS negative, HCG negative, Alcohol breath 0.294 (high).    Behavioral/Psychological/Social:  - Encourage unit programming    Safety:  - Continue precautions as noted above  - Status 15 minute checks      Legal Status: voluntary    Disposition Plan   Reason for ongoing admission: is unable to care for self due to severe psychosis or aurelio and requires detoxification from substance that poses a risk of bodily harm during withdrawal period  Discharge location: Plans to return to [facility]  Discharge Medications: not  ordered  Follow-up Appointments: not scheduled    Entered by: PHUONG Stokes CNP on 05/28/2025  at 4:38 AM

## 2025-05-28 NOTE — PLAN OF CARE
Rehab Group    Start time: 1115  End time: 1200  Patient time total: 45 minutes    attended full group    #8 attended   Group Type: occupational therapy   Group Topic Covered: balanced lifestyle, cognitive activities, coping skills, healthy leisure time, problem solving, and social skills       Group Session Detail:  OT CLINIC     Patient Response/Contribution:  cooperative with task, organized, socially appropriate, and actively engaged       Patient Detail:    Occupational therapy clinic to facilitate coping skill exploration, creative expression within personally meaningful activities, and clinical observation of social, cognitive, and kinesthetic performance skills. Pt response: Pt presented with bright affect, elevated mood. IND to initiate, gather materials, sequence, and adjust to workspace demands as needed to resume working on her structured task.  IND With all performance skills. Demonstrated good focus, attention to detail. Able to ask for assistance and appeared comfortable interacting with peers and staff.         [id number] OT Group (2 or more in attendance)      Patient Active Problem List   Diagnosis    Attention deficit hyperactivity disorder (ADHD), combined type    Severe recurrent major depression without psychotic features (H)    Suicidal ideation    Alcohol intoxication delirium    Major depressive disorder, remission status unspecified, unspecified whether recurrent    Alcohol dependence, continuous drinking behavior (H)

## 2025-05-28 NOTE — PROGRESS NOTES
Problem: Sleep Disturbance  Goal: Adequate Sleep/Rest  Outcome: Progressing   Goal Outcome Evaluation:        Pt appeared to have slept for 6.25 hours.  Respirations are even and unlabored while asleep.  No safety/behavioral/medical concerns this shift.  No prn administered.  Pt remain on SI/SIB/withdrawal precautions.  Pt woke up early and was observed pacing the hallway and listening to music via headphones.

## 2025-05-28 NOTE — PLAN OF CARE
Problem: Psychotic Symptoms  Goal: Psychotic Symptoms  Description: Signs and symptoms of listed problems will be absent or manageable.  Outcome: Progressing   Goal Outcome Evaluation:     Plan of Care Reviewed With: patient       Pt ate breakfast and was med complaint, She endorsed anxiety 2/10, and depression 2/10. She denied pain, SI, HI, and hallucinations. Pt discussed that she feels other peers disrespect her with writer. Pt decide she does not have to confront or fight with others. She attended morning OT, ate lunch and attended afternoon group. She began a puzzle on the counter near the television.

## 2025-05-28 NOTE — PLAN OF CARE
Team Note Due:  Monday    Assessment/Intervention/Current Symtoms and Care Coordination:  Chart review and met with team, discussed pt progress, symptomology, and response to treatment.  Discussed the discharge plan and any potential impediments to discharge.    CTC received call from the transition clinic stating they can complete the DA for [facility] tomorrow at 1:30. CTC reached out to staff to determine if an assessment could be completed sooner.     CTC met with pt. Pt reports that she wants to go to [facility], but is willing to do the  [facility] [place] [facility]. She states that her father lost his job and that he has COBRA coverage now. CTC states that CTC will check with pt's mom to discuss this and see if she can provide the current insurance information to [facility]. CTC updated about time of possible DA.     CTC made p.c to Sarah. CTC discussed the program with [facility]. She reports that she believes that it would be good for pt to do this program. CTC discussed pt's wishes to return back to [facility] and requested she call [facility] to update regarding the insurance coverage. She states that she will do this and see if they got the letter regarding the COBRA coverage.     Discharge Plan or Goal:  Dispo Plan: Pending stabilization. Consider return home with [facility].   Discharge Date/ time: TBD  Transportation:   AVS Completed: Yes [] No[]  Provisional Discharge: Yes[] No[]       Barriers to Discharge:  Patient requires further psychiatric stabilization due to current symptomology of SI as well as medication management.        Referral Status:  [facility] PHP; pt wants psychiatry.      Legal Status:  72 hour hold starting 5/17/25 at 604 - but voluntary as of 5/19 @1643  [facility]: [place]  File Number:   Start and expiration date of commitment:     Contacts (include JOSHUA status):  JOSHUA for Pt's mom   Sarah Briggs [phone]      Upcoming Meetings and Dates/Important Information and next steps:  [facility] to set up Ipad  for DA.   CTC Follow Up with [facility] program navigators regarding start date once DA is completed.     5/29/25- 1:30pm - DA for programmatic care.

## 2025-05-28 NOTE — PLAN OF CARE
BEH IP Unit Acuity Rating Score (UARS)  Patient is given one point for every criteria they meet.    CRITERIA SCORING   On a 72 hour hold, court hold, committed, stay of commitment, or revocation. 0   Patient LOS on BEH unit exceeds 20 days. 0  LOS: 11   Patient under guardianship, 55+, otherwise medically complex, or under age 11. 0   Suicide ideation without relief of precipitating factors. 1   Current plan for suicide. 0   Current plan for homicide. 0   Imminent risk or actual attempt to seriously harm another without relief of factors precipitating the attempt. 0   Severe dysfunction in daily living (ex: complete neglect for self care, extreme disruption in vegetative function, extreme deterioration in social interactions). 1   Recent (last 7 days) or current physical aggression in the ED or on unit. 0   Restraints or seclusion episode in past 72 hours. 0   Recent (last 7 days) or current verbal aggression, agitation, yelling, etc., while in the ED or unit. 0   Active psychosis. 0   Need for constant or near constant redirection (from leaving, from others, etc).  0   Intrusive or disruptive behaviors. 0   Patient requires 3 or more hours of individualized nursing care per 8-hour shift (i.e. for ADLs, meds, therapeutic interventions). 0   TOTAL 2

## 2025-05-28 NOTE — PLAN OF CARE
"Problem: Suicide Risk  Goal: Absence of Self-Harm  Description: Pt will remain safe on the unit as evidenced by pt identify and utilize 3 coping skills, attend 50% of programming and timely inform staff if not feel safe and/or have difficulty managing emotions or disturbing thoughts   Outcome: Progressing   Goal Outcome Evaluation:    Plan of Care Reviewed With: patient      Pt presents as calm. Pt spends the evening playing cards in the lounge or walking around and listening to music. Pt attends groups and is social with others.     Pt had a verbal altercation with another pt this evening. RN talked with pt regarding situation and pt became tearful. Pt states the other pt is disrespecting her. RN and pt worked through these feelings and the situation and pt was able to calm. Pt requested prn hydroxyzine at this time, given 25 mg; effective.     Pt reports feeling \"detatched from my emotions.\" Pt reports she does feel better but also has some lingering depression, anxiety. Pt reports she is looking forward to treatment and moving out of parents house. No other mental health concerns at this time. Pt took scheduled seroquel at bedtime.         "

## 2025-05-28 NOTE — PLAN OF CARE
Rehab Group    Start time: 1015  End time: 1100  Patient time total: 45 minutes    attended full group    #5 attended   Group Type: occupational therapy   Group Topic Covered: cognitive activities, educational support, and self-esteem       Group Session Detail:  General health and coping: self-esteem     Patient Response/Contribution:  positive affect, cooperative with task, organized, supportive of peers, socially appropriate, expressed understanding of topic, and actively engaged       Patient Detail:    Creative expression task with cognitive challenge: origami affirmation book. Was provided with set-up of materials and verbal + visual directions of multi-step task. This activity challenged pt's concentration, problem solving, sequencing, and frustration tolerance, while also encouraging pt to creatively express positive affirmations and coping strategies. Pt Response: Required no assistance to reach task completion. Demonstrated good frustration tolerance. Pt identified numerous insightful self-esteem promotion strategies. Appeared comfortable interacting with peers and was a supportive group member.        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

## 2025-05-28 NOTE — PLAN OF CARE
Rehab Group    Start time: 1315  End time: 1400  Patient time total: 45 minutes    attended full group     #3 attended   Group Type: occupational therapy   Group Topic Covered: balanced lifestyle, coping skills, mindfulness, problem solving, and relaxation        Group Session Detail:  Mental health management     Patient Response/Contribution:  positive affect, cooperative with task, organized, socially appropriate, listened actively, attentive, and actively engaged       Patient Detail:    Mental health management group with a focus on self-regulating movement to facilitate relaxation/stress management, body awareness, fostering a sense of attunement and promoting sensory integration via yoga, accupressure and breathing exercises. Session began and ended with an offer to reflect verbally on somatic experiences with the group. Pt Response: Pt followed and engaged in 100% of the offered exercises, and remained appropriate within group expectations.       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

## 2025-05-28 NOTE — PROGRESS NOTES
Rehab Group    Start time: 2030  End time: 2120  Patient time total: 50 minutes    attended full group    #4 attended   Group Type: recreation   Group Topic Covered: activity therapy, healthy leisure time, and social skills       Group Session Detail:  TR leisure group     Patient Response/Contribution:  cooperative with task, socially appropriate, attentive, and actively engaged       Patient Detail:    Pt attended the structured Therapeutic Recreation group, participating in a group activity. Pt participated in a leisure activity with social engagement to gain self-esteem, manage behaviors, improve social skills, decrease isolation, and reduce anxiety/depression.   Pt remained focused and engaged throughout group activity.  Pt was sociable and was appropriate with interactions with the others in group. Pt was an active participant, contributing to the clues and descriptions throughout the activity. Pt needed one cue for redirection for giving an inappropriate clue for group, but maintained appropriateness the remainder of the time.       Activity Therapy Per 15 min ()      Patient Active Problem List   Diagnosis    Attention deficit hyperactivity disorder (ADHD), combined type    Severe recurrent major depression without psychotic features (H)    Suicidal ideation    Alcohol intoxication delirium    Major depressive disorder, remission status unspecified, unspecified whether recurrent    Alcohol dependence, continuous drinking behavior (H)

## 2025-05-28 NOTE — PROGRESS NOTES
[facility], [place]   Psychiatric Progress Note  Hospital Day: 10        Interim History:   The patient's care was discussed with the treatment team during the daily team meeting and/or staff's chart notes were reviewed.  Staff report patient slept  6.5 hours last night.  Out in the hallway, socialize with peers.  Staff met with the patient to change her room to POD 2 but she refused, stated the other peer came to her room, patient was educated that it was inappropriate for patients to visit in each other's room.      watched TV in the lounge.  Patient did not report any anxiety or anxiety throughout this morning.  She was medication compliant.    Upon interview, the patient was interviewed in her room in stations 6A.  Patient reports her mood as good but bored.  Affect was flat to neutral.  Patient reports disappointed that patient does not cover her PHP program for [facility].  However [facility] is covered, but does seems not to be the preference for the patient as he continued to lament not being able to go to [facility].  Patient reports that odd effort at the [employer] screening which was long and stressful was futile.  She dreaded going through the same process with [facility].  Patient was reassured that sometimes what you have no control over, just let it go.  Patient was wondering if there is a way she could talk to the insurance to make them change their mind.  Patient was referred to the Psychiatric who will be able to help her in that regard patient believes that her medications are working with no side effects.  Patient rated her anxiety as 2/10 and depression as 3/10.  She denied auditory/visual hallucinations, suicidal/homicidal ideations and thoughts of self-harm           Medications:     Current Facility-Administered Medications   Medication Dose Route Frequency Provider Last Rate Last Admin    atomoxetine (STRATTERA) capsule 10 mg  10 mg Oral Daily Hunter Liu, APRN CNP    "10 mg at 05/27/25 0836    FLUoxetine (PROzac) capsule 20 mg  20 mg Oral Daily NoeHunter APRN CNP   20 mg at 05/27/25 0836    naltrexone (DEPADE/REVIA) tablet 50 mg  50 mg Oral Daily NoeHunter APRN CNP   50 mg at 05/27/25 0836    QUEtiapine (SEROquel) tablet 50 mg  50 mg Oral At Bedtime NoeHunter APRN CNP   50 mg at 05/26/25 2130          Allergies:   No Known Allergies       Labs:     No results found for this or any previous visit (from the past 24 hours).         Psychiatric Examination:     /81 (Patient Position: Sitting, Cuff Size: Adult Small)   Pulse 76   Temp 97.8  F (36.6  C) (Oral)   Resp 18   Ht 1.6 m (5' 3\")   Wt 46.6 kg (102 lb 11.2 oz)   SpO2 100%   BMI 18.19 kg/m    Weight is 102 lbs 11.2 oz  Body mass index is 18.19 kg/m .    Weight over time:  Vitals:    05/16/25 2223 05/17/25 1100 05/20/25 0900   Weight: 45.1 kg (99 lb 8 oz) 44.2 kg (97 lb 8 oz) 46.6 kg (102 lb 11.2 oz)       Orthostatic Vitals       None              Cardiometabolic risk assessment. 05/20/25      Reviewed patient profile for cardiometabolic risk factors    Date taken /Value  REFERENCE RANGE   Abdominal Obesity  (Waist Circumference)   See nursing flowsheet Women >=35 in (88 cm)   Men >=40 in (102 cm)      Triglycerides  Triglycerides   Date Value Ref Range Status   05/18/2025 43 <150 mg/dL Final       >=150 mg/dL (1.7 mmol/L) or current treatment for elevated triglycerides   HDL cholesterol  Direct Measure HDL   Date Value Ref Range Status   05/18/2025 150 >=50 mg/dL Final   ]   Women <50 mg/dL (1.3 mmol/L) in women or current treatment for low HDL cholesterol  Men <40 mg/dL (1 mmol/L) in men or current treatment for low HDL cholesterol     Fasting plasma glucose (FPG) Lab Results   Component Value Date     04/24/2025    GLC 76 04/24/2025      FPG >=100 mg/dL (5.6 mmol/L) or treatment for elevated blood glucose   Blood pressure  BP Readings from Last 3 Encounters:   05/27/25 117/81 " "  04/25/25 98/72   06/11/23 (!) 140/84    Blood pressure >=130/85 mmHg or treatment for elevated blood pressure   Family History  See family history     Mental Status Exam:  Appearance: awake, alert, adequately groomed, dressed in hospital scrubs, and appeared as age stated  Attitude:  cooperative  Eye Contact:  good  Mood:  \"Good but bored\"  Affect: Flat, neutral  Speech:  clear, coherent  Language: fluent and intact in English  Psychomotor, Gait, Musculoskeletal:  no evidence of tardive dyskinesia, dystonia, or tics  Throught Process:  goal oriented  Associations:  no loose associations  Thought Content:  Denies auditory/visual hallucination, suicidal/homicidal ideation and thoughts of self-harm  Insight:  fair  Judgement: Fair  Oriented to:  time, person, and place  Attention Span and Concentration:  intact  Recent and Remote Memory:  fair  Fund of Knowledge:  appropriate           Precautions:     Behavioral Orders   Procedures    Code 1 - Restrict to Unit    [facility]     Until discharge, Extended Care to offer psychotherapeutic services to mental health patients boarding for admission or stabilization. These services are to include but are not limited to: individual psychotherapy, diagnostic assessment, case management and care planning, safety planning, etc. This may include up to 1 visit per day. If patient is physically located at [facility] or [facility], group psychotherapy up to 2 time per day may be offered.     Routine Programming     As clinically indicated    Self Injury Precaution    Status 15     Every 15 minutes.    Suicide precautions: Suicide Risk: MODERATE; Clinical rationale to override score: lack of access to a plan for self-harm     Patients on Suicide Precautions should have a Combination Diet ordered that includes a Diet selection(s) AND a Behavioral Tray selection for Safe Tray - with utensils, or Safe Tray - NO utensils       Suicide Risk:   MODERATE     Clinical rationale to override " score::   lack of access to a plan for self-harm    Withdrawal precautions          Diagnoses:   Severe recurrent major depression without psychotic features  Attention deficit hyperactivity disorder (ADHD), combined type  Alcohol intoxication delirium  Alcohol dependence, continuous drinking behavior (H)      Clinically Significant Risk Factors                                    # Financial/Environmental Concerns: none                     Assessment & Plan:     Assessment and hospital summary:  This patient is a 24 year old female with history of Depression who presented to ED with SI  in context of alcohol intoxication. Symptoms and presentation at this time is most consistent with MDD. I have discussed the risks, benefits, and alternatives of medications. Patient is amenable to a trial. Patient will likely benefit from a residential treatment upon discharge.     5/25/2025: Patient has not taking Vyvanse since admission.  She felt she does not need it as she is not involved in any activity during this hospitalization.  Patient believes that taking it will amount to a waste.  Today she requested for her ADHD medication Strattera, she does not remember how much she was taking before, but remembers it has been over a week that she took the medication. With the Pharm.D. support that is reasonable to start her on 10 mg.  Patient has been started on the Strattera were Vyvanse has been discontinued.        Today's Changes:  No medication changes  DA IP Consult placed      Strattera capsule 10 mg oral daily  Discontinue Vyvanse capsule 40 mg oral daily.  (Patient has not been taking since admission)    Target psychiatric symptoms and interventions  Strattera 10 mg oral daily  Naltrexone (DEPADE/Revia) tablet 50 mg oral daily  Seroquel 50 mg at bedtime  Fluoxetine 20 mg oral daily  Risks, benefits, and alternatives discussed at length with patient.     Acute Medical Problems and Treatments:  Acute medical concerns:  - No  acute medical concerns    Pertinent labs/imaging:  UDS negative, HCG negative, Alcohol breath 0.294 (high).    Behavioral/Psychological/Social:  - Encourage unit programming    Safety:  - Continue precautions as noted above  - Status 15 minute checks      Legal Status: voluntary    Disposition Plan   Reason for ongoing admission: is unable to care for self due to severe psychosis or aurelio and requires detoxification from substance that poses a risk of bodily harm during withdrawal period  Discharge location: Plans to return to [facility]  Discharge Medications: not ordered  Follow-up Appointments: not scheduled    Entered by: PHUONG Stokes CNP on 05/27/2025  at 9:03 PM

## 2025-05-29 VITALS
SYSTOLIC BLOOD PRESSURE: 117 MMHG | WEIGHT: 105.8 LBS | DIASTOLIC BLOOD PRESSURE: 83 MMHG | TEMPERATURE: 98.5 F | HEIGHT: 63 IN | OXYGEN SATURATION: 99 % | HEART RATE: 81 BPM | RESPIRATION RATE: 18 BRPM | BODY MASS INDEX: 18.75 KG/M2

## 2025-05-29 PROCEDURE — 90853 GROUP PSYCHOTHERAPY: CPT

## 2025-05-29 PROCEDURE — 128N000002 HC R&B CD/MH ADOLESCENT

## 2025-05-29 PROCEDURE — 250N000013 HC RX MED GY IP 250 OP 250 PS 637: Performed by: PSYCHIATRY & NEUROLOGY

## 2025-05-29 PROCEDURE — 250N000013 HC RX MED GY IP 250 OP 250 PS 637: Performed by: CLINICAL NURSE SPECIALIST

## 2025-05-29 PROCEDURE — 99231 SBSQ HOSP IP/OBS SF/LOW 25: CPT | Performed by: CLINICAL NURSE SPECIALIST

## 2025-05-29 RX ADMIN — ATOMOXETINE 10 MG: 10 CAPSULE ORAL at 08:47

## 2025-05-29 RX ADMIN — FLUOXETINE HYDROCHLORIDE 20 MG: 20 CAPSULE ORAL at 08:47

## 2025-05-29 RX ADMIN — QUETIAPINE FUMARATE 50 MG: 50 TABLET ORAL at 21:33

## 2025-05-29 RX ADMIN — ACETAMINOPHEN 650 MG: 325 TABLET, FILM COATED ORAL at 16:08

## 2025-05-29 RX ADMIN — NALTREXONE HYDROCHLORIDE 50 MG: 50 TABLET, FILM COATED ORAL at 08:47

## 2025-05-29 ASSESSMENT — ACTIVITIES OF DAILY LIVING (ADL)
ADLS_ACUITY_SCORE: 25
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
## 2025-05-29 NOTE — PROGRESS NOTES
"  Rehab Group    Start time: 2030  End time: 2115  Patient time total: 45 minutes    attended full group    #5 attended   Group Type: art   Group Topic Covered: activity therapy       Group Session Detail:  Art Therapy directive was to answer questions on handout \"the inner landscape\" related to the five senses and questions to express aspects of self/identity.  Goals of directive: to create a visual self narrative, to express personal strengths and goals, to express aspects of self/identity.     Patient Response/Contribution:  cooperative with task       Patient Detail:    Pt was an engaged participant, focused on task for the full duration of group. Pt needs another group to finish artwork and verbally process. Pt enjoyed working with oil pastels.  Pts mood was calm.      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

## 2025-05-29 NOTE — PLAN OF CARE
"Problem: Suicide Risk  Goal: Absence of Self-Harm  Description: Pt will remain safe on the unit as evidenced by pt identify and utilize 3 coping skills, attend 50% of programming and timely inform staff if not feel safe and/or have difficulty managing emotions or disturbing thoughts   Outcome: Progressing   Goal Outcome Evaluation:    Plan of Care Reviewed With: patient      Pt presents as calm. She is present in the milieu and socializes with peers. Pt reports feeling \"pretty depressed\" today. Pt reports she is bored and this is contributing to her depression. Pt reports she feels disinterested in unit activities today. Pt reports less anxiety today. No SI/SIB or thoughts to harm others. Pt has been safe this shift. Pt had a visit from sister this evening.     Pt reported left ankle pain 5/10. Pt given prn tylenol, declined cold pack; later reported not helpful. Pt took scheduled evening seroquel with no issues.           "
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
## 2025-05-29 NOTE — PLAN OF CARE
Team Note Due:  Monday    Assessment/Intervention/Current Symtoms and Care Coordination:  Chart review and met with team, discussed pt progress, symptomology, and response to treatment.  Discussed the discharge plan and any potential impediments to discharge.    CTC consulted with provider regarding orders for assessment. Pt cannot do the PHP due to MIN use. CTC discussed other options and provider ordered MIN assessment. Pt aware of this as of yesterday.     CTC spoke with pt's corazon Smith to update regarding programs and options. She reports they do not have the COBRA information yet and cannot provide this to Janusz. CTC discussed other program options.     Discharge Plan or Goal:  Dispo Plan: Pending stabilization. Consider return home with MI/CD programming.   Discharge Date/ time: TBD  Transportation:   AVS Completed: Yes [] No[]  Provisional Discharge: Yes[] No[]       Barriers to Discharge:  Patient requires further psychiatric stabilization due to current symptomology of SI as well as medication management.        Referral Status:  Dual Dx IOP; pt wants psychiatry.      Legal Status:  72 hour hold starting 5/17/25 at 604 - but voluntary as of 5/19 @1641  [facility]: [place]  File Number:   Start and expiration date of commitment:     Contacts (include JOSHUA status):  JOSHUA for Pt's corazon Briggs [phone]      Upcoming Meetings and Dates/Important Information and next steps:  CTC to follow up on MI/CD program referrals once pt completes MIN assessment.

## 2025-05-29 NOTE — PLAN OF CARE
BEH IP Unit Acuity Rating Score (UARS)  Patient is given one point for every criteria they meet.    CRITERIA SCORING   On a 72 hour hold, court hold, committed, stay of commitment, or revocation. 0   Patient LOS on BEH unit exceeds 20 days. 0  LOS: 12   Patient under guardianship, 55+, otherwise medically complex, or under age 11. 0   Suicide ideation without relief of precipitating factors. 1   Current plan for suicide. 0   Current plan for homicide. 0   Imminent risk or actual attempt to seriously harm another without relief of factors precipitating the attempt. 0   Severe dysfunction in daily living (ex: complete neglect for self care, extreme disruption in vegetative function, extreme deterioration in social interactions). 1   Recent (last 7 days) or current physical aggression in the ED or on unit. 0   Restraints or seclusion episode in past 72 hours. 0   Recent (last 7 days) or current verbal aggression, agitation, yelling, etc., while in the ED or unit. 0   Active psychosis. 0   Need for constant or near constant redirection (from leaving, from others, etc).  0   Intrusive or disruptive behaviors. 0   Patient requires 3 or more hours of individualized nursing care per 8-hour shift (i.e. for ADLs, meds, therapeutic interventions). 0   TOTAL 2

## 2025-05-29 NOTE — PROGRESS NOTES
"Rehab Group     Start time: 1030  End time: 1115  Patient time total: 45 minutes     attended full group     #7 attended   Group Type: music   Group Topic Covered: coping skills, self-care, self-esteem, sensory intervention, and social skills      Group Session Detail: Rhythm Band w/maracas and music       Patient Response/Contribution:  cooperative with task, listened actively, and attentive      Patient Detail: Cooperatively engaged in  Rhythm Band group to decrease anxiety and promote positive socialization.  Pleasant, upbeat affect, appropriately engaged in session, responding well to the music.       Pt expressed resonation with michael from the song \"How to Love\" by Sergio Cartagena; \"But you can't have a man look at you for 5 seconds without you being insecure\" ..    Pt sat on the floor for part of session curled over.  Pt appears to experience music very kinesthetically.       Activity Therapy Per 15 min ()    Patient Active Problem List   Diagnosis    Attention deficit hyperactivity disorder (ADHD), combined type    Severe recurrent major depression without psychotic features (H)    Suicidal ideation    Alcohol intoxication delirium    Major depressive disorder, remission status unspecified, unspecified whether recurrent    Alcohol dependence, continuous drinking behavior (H)       "

## 2025-05-29 NOTE — PLAN OF CARE
Problem: Adult Inpatient Plan of Care  Goal: Plan of Care Review  Description: The Plan of Care Review/Shift note should be completed every shift.  The Outcome Evaluation is a brief statement about your assessment that the patient is improving, declining, or no change.  This information will be displayed automatically on your shiftnote.  Outcome: Progressing  Flowsheets (Taken 5/29/2025 1232)  Plan of Care Reviewed With: patient   Goal Outcome Evaluation:      Plan of Care Reviewed With: patient  Pt was visible in the milieu, bright and social with peers.   Pt was agreeable to check in, denied any SI/SIB AH or VH.Pt endorsed anxiety rated 2/10,depression rated 4/10 related to being at the hospital, pt stated she's bored.   Pt reported good appetite and good sleep, will continue to monitor.

## 2025-05-29 NOTE — CONSULTS
Met with patient to discuss possible MIN treatment options and to possibly complete an assessment. Assessment has been completed at this time and patient is being recommended:         Recommendations:   2.1 Intensive Outpatient Services      Referrals/ Alternatives:  [facility]  Adult Chemical Dependency Program  [address]  [address]  [place] [address]  [phone]                [facility]  [address] [place] [address] [address]  [place] [address]  [phone]     [facility]  [address] [facility] [address]  [place] [address]  [phone]     [facility]  [address] [facility] [address]  [place] [address]  [phone]    Referrals are being made at this time.  Patient is aware of the referrals and is in agreement. Patient gave verbal JOSHUA's for the above referrals.     Eliseo Ortega, [facility] on 5/29/2025 at 4:21 PM

## 2025-05-29 NOTE — PROGRESS NOTES
"[facility], [place]   Psychiatric Progress Note  Hospital Day: 12        Interim History:   The patient's care was discussed with the treatment team during the daily team meeting and/or staff's chart notes were reviewed.  Staff report patient slept 6.25 hours last night.  Pt checked in as doing okay, rated anxiety at 2 and depression at 3 with 10 being worst. Pt rated overall mood at calm and good.  Pt denies SI/SIB/HI. Denies visual and auditory hallucinations   Pt requested and received no PRN. Pt was medication compliant. Pt denied , medication side effects and medical concerns.     Upon interview, the patient was interviewed in her room in stations 6A.  Patient reports her mood as okay. Patient was about to start a group before this assessment, states  \"I want to do this group.\" Patient reports that the PHP assessment was yet to be done. Reassured that the CTC would follow up with them and let her know when they would be available for the assessment. Later CTC informed that they would do it tomorrow at 11 am. Patient reports nothing has changed from yesterday. Patient reports that [facility] is still her priority. She rated her anxiety as 2/10 and depression as 3/10.  She denied auditory/visual hallucinations, suicidal/homicidal ideations and thoughts of self-harm.           Medications:     Current Facility-Administered Medications   Medication Dose Route Frequency Provider Last Rate Last Admin    atomoxetine (STRATTERA) capsule 10 mg  10 mg Oral Daily Hunter Liu APRN CNP   10 mg at 05/29/25 0847    FLUoxetine (PROzac) capsule 20 mg  20 mg Oral Daily Hunter Liu APRN CNP   20 mg at 05/29/25 0847    naltrexone (DEPADE/REVIA) tablet 50 mg  50 mg Oral Daily Hunter Liu APRN CNP   50 mg at 05/29/25 0847    QUEtiapine (SEROquel) tablet 50 mg  50 mg Oral At Bedtime Hunter Liu APRN CNP   50 mg at 05/28/25 2081          Allergies:   No Known Allergies       Labs: " "    No results found for this or any previous visit (from the past 24 hours).         Psychiatric Examination:     /81 (BP Location: Left arm, Patient Position: Sitting, Cuff Size: Adult Small)   Pulse 93   Temp 98  F (36.7  C) (Oral)   Resp 18   Ht 1.6 m (5' 3\")   Wt 48 kg (105 lb 12.8 oz)   SpO2 100%   BMI 18.74 kg/m    Weight is 105 lbs 12.8 oz  Body mass index is 18.74 kg/m .    Weight over time:  Vitals:    05/16/25 2223 05/17/25 1100 05/20/25 0900 05/29/25 0900   Weight: 45.1 kg (99 lb 8 oz) 44.2 kg (97 lb 8 oz) 46.6 kg (102 lb 11.2 oz) 48 kg (105 lb 12.8 oz)       Orthostatic Vitals       None              Cardiometabolic risk assessment. 05/20/25      Reviewed patient profile for cardiometabolic risk factors    Date taken /Value  REFERENCE RANGE   Abdominal Obesity  (Waist Circumference)   See nursing flowsheet Women >=35 in (88 cm)   Men >=40 in (102 cm)      Triglycerides  Triglycerides   Date Value Ref Range Status   05/18/2025 43 <150 mg/dL Final       >=150 mg/dL (1.7 mmol/L) or current treatment for elevated triglycerides   HDL cholesterol  Direct Measure HDL   Date Value Ref Range Status   05/18/2025 150 >=50 mg/dL Final   ]   Women <50 mg/dL (1.3 mmol/L) in women or current treatment for low HDL cholesterol  Men <40 mg/dL (1 mmol/L) in men or current treatment for low HDL cholesterol     Fasting plasma glucose (FPG) Lab Results   Component Value Date     04/24/2025    GLC 76 04/24/2025      FPG >=100 mg/dL (5.6 mmol/L) or treatment for elevated blood glucose   Blood pressure  BP Readings from Last 3 Encounters:   05/29/25 119/81   04/25/25 98/72   06/11/23 (!) 140/84    Blood pressure >=130/85 mmHg or treatment for elevated blood pressure   Family History  See family history     Mental Status Exam:  Appearance: awake, alert, adequately groomed, dressed in hospital scrubs, and appeared as age stated  Attitude:  cooperative  Eye Contact:  good  Mood: \"Okay\"  Affect: Flat, " neutral  Speech:  clear, coherent  Language: fluent and intact in English  Psychomotor, Gait, Musculoskeletal:  no evidence of tardive dyskinesia, dystonia, or tics  Throught Process:  goal oriented  Associations:  no loose associations  Thought Content:  Denies auditory/visual hallucination, suicidal/homicidal ideation and thoughts of self-harm  Insight:  fair  Judgement: Fair  Oriented to:  time, person, and place  Attention Span and Concentration:  intact  Recent and Remote Memory:  fair  Fund of Knowledge:  appropriate           Precautions:     Behavioral Orders   Procedures    Code 1 - Restrict to Unit    [facility] Extended Care     Until discharge, Extended Care to offer psychotherapeutic services to mental health patients boarding for admission or stabilization. These services are to include but are not limited to: individual psychotherapy, diagnostic assessment, case management and care planning, safety planning, etc. This may include up to 1 visit per day. If patient is physically located at [facility] or [facility], group psychotherapy up to 2 time per day may be offered.     Routine Programming     As clinically indicated    Self Injury Precaution    Status 15     Every 15 minutes.    Suicide precautions: Suicide Risk: MODERATE; Clinical rationale to override score: lack of access to a plan for self-harm     Patients on Suicide Precautions should have a Combination Diet ordered that includes a Diet selection(s) AND a Behavioral Tray selection for Safe Tray - with utensils, or Safe Tray - NO utensils       Suicide Risk:   MODERATE     Clinical rationale to override score::   lack of access to a plan for self-harm    Withdrawal precautions          Diagnoses:   Severe recurrent major depression without psychotic features  Attention deficit hyperactivity disorder (ADHD), combined type  Alcohol intoxication delirium  Alcohol dependence, continuous drinking behavior (H)      Clinically Significant Risk Factors                                     # Financial/Environmental Concerns: none                     Assessment & Plan:     Assessment and hospital summary:  This patient is a 24 year old female with history of Depression who presented to ED with SI  in context of alcohol intoxication. Symptoms and presentation at this time is most consistent with MDD. I have discussed the risks, benefits, and alternatives of medications. Patient is amenable to a trial. Patient will likely benefit from a residential treatment upon discharge.     5/25/2025: Patient has not taking Vyvanse since admission.  She felt she does not need it as she is not involved in any activity during this hospitalization.  Patient believes that taking it will amount to a waste.  Today she requested for her ADHD medication Strattera, she does not remember how much she was taking before, but remembers it has been over a week that she took the medication. With the Pharm.D. support that is reasonable to start her on 10 mg.  Patient has been started on the Strattera were Vyvanse has been discontinued.        Today's Changes:  No medication changes      Target psychiatric symptoms and interventions  Strattera 10 mg oral daily  Naltrexone (DEPADE/Revia) tablet 50 mg oral daily  Seroquel 50 mg at bedtime  Fluoxetine 20 mg oral daily  Risks, benefits, and alternatives discussed at length with patient.     Acute Medical Problems and Treatments:  Acute medical concerns:  - No acute medical concerns    Pertinent labs/imaging:  UDS negative, HCG negative, Alcohol breath 0.294 (high).    Behavioral/Psychological/Social:  - Encourage unit programming    Safety:  - Continue precautions as noted above  - Status 15 minute checks      Legal Status: voluntary    Disposition Plan   Reason for ongoing admission: is unable to care for self due to severe psychosis or aurelio and requires detoxification from substance that poses a risk of bodily harm during withdrawal period  Discharge location:  Plans to return to [facility]  Discharge Medications: not ordered  Follow-up Appointments: not scheduled    Entered by: PHUONG Stokes CNP on 05/29/2025  at 12:14 PM

## 2025-05-29 NOTE — PLAN OF CARE
Group Attendance:  attended partial group    Time session began: 1415  Time session ended: 1500  Patient's total time in group: 20    Total # Attendees   5   Group Type psychotherapeutic     Group Topic Covered emotional regulation, symptom management, healthy coping skills, and relaxation and grounding techniques/coping skills     Group Session Detail Group members checked in with how they are feeling. The group discussed utilizing music as a therapeutic tool to explore emotions, expression, and promote relaxation. Writer discussed the significance of music in emotional regulation and coping strategies. Group members shared personal experiences and associations with songs they chose.      Patient's response to the group topic/interactions:  positive affect, cooperative with task, organized, supportive of peers, socially appropriate, listened actively, expressed understanding of topic, attentive, and actively engaged     Patient Details: Pt attended group, took a moment to check-in. Pt was engaged with peers, but was pulled out of group for an interview. Pt returned for the last few minutes.         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

## 2025-05-29 NOTE — PROGRESS NOTES
Problem: Sleep Disturbance  Goal: Adequate Sleep/Rest  Outcome: Progressing   Goal Outcome Evaluation:        Pt appeared to have slept for 7 hours.  Respirations are even and unlabored while asleep.  No safety/behavioral/medical concerns this shift.  No prn administered.  Pt remain on SI/SIB/Withdrawal precautions.

## 2025-05-30 VITALS
BODY MASS INDEX: 18.75 KG/M2 | DIASTOLIC BLOOD PRESSURE: 89 MMHG | TEMPERATURE: 98 F | SYSTOLIC BLOOD PRESSURE: 129 MMHG | HEIGHT: 63 IN | HEART RATE: 79 BPM | RESPIRATION RATE: 18 BRPM | WEIGHT: 105.8 LBS | OXYGEN SATURATION: 98 %

## 2025-05-30 PROCEDURE — 99239 HOSP IP/OBS DSCHRG MGMT >30: CPT | Performed by: CLINICAL NURSE SPECIALIST

## 2025-05-30 PROCEDURE — 250N000013 HC RX MED GY IP 250 OP 250 PS 637: Performed by: CLINICAL NURSE SPECIALIST

## 2025-05-30 PROCEDURE — H2032 ACTIVITY THERAPY, PER 15 MIN: HCPCS

## 2025-05-30 RX ORDER — ATOMOXETINE 10 MG/1
10 CAPSULE ORAL DAILY
Qty: 30 CAPSULE | Refills: 0 | Status: SHIPPED | OUTPATIENT
Start: 2025-05-31

## 2025-05-30 RX ORDER — NALTREXONE HYDROCHLORIDE 50 MG/1
50 TABLET, FILM COATED ORAL DAILY
Qty: 30 TABLET | Refills: 0 | Status: SHIPPED | OUTPATIENT
Start: 2025-05-31

## 2025-05-30 RX ORDER — QUETIAPINE FUMARATE 50 MG/1
50 TABLET, FILM COATED ORAL AT BEDTIME
Qty: 30 TABLET | Refills: 0 | Status: SHIPPED | OUTPATIENT
Start: 2025-05-30

## 2025-05-30 RX ADMIN — NALTREXONE HYDROCHLORIDE 50 MG: 50 TABLET, FILM COATED ORAL at 08:36

## 2025-05-30 RX ADMIN — FLUOXETINE HYDROCHLORIDE 20 MG: 20 CAPSULE ORAL at 08:36

## 2025-05-30 RX ADMIN — ATOMOXETINE 10 MG: 10 CAPSULE ORAL at 08:36

## 2025-05-30 ASSESSMENT — ACTIVITIES OF DAILY LIVING (ADL)
ADLS_ACUITY_SCORE: 25
ORAL_HYGIENE: INDEPENDENT
LAUNDRY: UNABLE TO COMPLETE
DRESS: SCRUBS (BEHAVIORAL HEALTH)
ADLS_ACUITY_SCORE: 25
HYGIENE/GROOMING: INDEPENDENT
HYGIENE/GROOMING: INDEPENDENT
ADLS_ACUITY_SCORE: 25
ORAL_HYGIENE: INDEPENDENT
ADLS_ACUITY_SCORE: 25
ADLS_ACUITY_SCORE: 25

## 2025-05-30 NOTE — PLAN OF CARE
BEH IP Unit Acuity Rating Score (UARS)  Patient is given one point for every criteria they meet.    CRITERIA SCORING   On a 72 hour hold, court hold, committed, stay of commitment, or revocation. 0   Patient LOS on BEH unit exceeds 20 days. 0  LOS: 13   Patient under guardianship, 55+, otherwise medically complex, or under age 11. 0   Suicide ideation without relief of precipitating factors. 1   Current plan for suicide. 0   Current plan for homicide. 0   Imminent risk or actual attempt to seriously harm another without relief of factors precipitating the attempt. 0   Severe dysfunction in daily living (ex: complete neglect for self care, extreme disruption in vegetative function, extreme deterioration in social interactions). 1   Recent (last 7 days) or current physical aggression in the ED or on unit. 0   Restraints or seclusion episode in past 72 hours. 0   Recent (last 7 days) or current verbal aggression, agitation, yelling, etc., while in the ED or unit. 0   Active psychosis. 0   Need for constant or near constant redirection (from leaving, from others, etc).  0   Intrusive or disruptive behaviors. 0   Patient requires 3 or more hours of individualized nursing care per 8-hour shift (i.e. for ADLs, meds, therapeutic interventions). 0   TOTAL 2

## 2025-05-30 NOTE — PROGRESS NOTES
Rehab Group    Start time: 1015  End time: 1115  Patient time total: 45 minutes    attended full group    #5 attended   Group Type: art   Group Topic Covered: activity therapy       Group Session Detail:  Art Therapy directive was to create collage art expressing pts goals for the future (vision board) collage.  Goals of directive: to identify personal strengths and goals, emotional expression, to create a personal self narrative, media exploration.     Patient Response/Contribution:  cooperative with task       Patient Detail:    Pt was an engaged participant, focused on task for the full duration of group. Pt finished artwork in second AT group and verbally processed. (See note).  Pts mood was calm, pleasant participant.      Activity Therapy Per 15 min ()      Patient Active Problem List   Diagnosis    Attention deficit hyperactivity disorder (ADHD), combined type    Severe recurrent major depression without psychotic features (H)    Suicidal ideation    Alcohol intoxication delirium    Major depressive disorder, remission status unspecified, unspecified whether recurrent    Alcohol dependence, continuous drinking behavior (H)

## 2025-05-30 NOTE — PROGRESS NOTES
Rehab Group    Start time: 1115  End time: 1215  Patient time total: 45 minutes    attended full group    #5 attended   Group Type: art   Group Topic Covered: activity therapy       Group Session Detail:  Art Therapy directive was to create collage art expressing pts goals for the future (vision board) collage.  Goals of directive: to identify personal strengths and goals, emotional expression, to create a personal self narrative, media exploration.     Patient Response/Contribution:  cooperative with task       Patient Detail:    Pt was an engaged participant, focused on task for the full duration of group. Pt finished mixed media collage and briefly shared with author and group. Pt cut out multiple images of tigers and lions from magazines and shared that theses images remind pt of her cats whom pt is fond of.   Pts mood was calm, at times indirectly involved with other pt in drug glorification-redirectable.  Pts mood was calm.      Activity Therapy Per 15 min ()      Patient Active Problem List   Diagnosis    Attention deficit hyperactivity disorder (ADHD), combined type    Severe recurrent major depression without psychotic features (H)    Suicidal ideation    Alcohol intoxication delirium    Major depressive disorder, remission status unspecified, unspecified whether recurrent    Alcohol dependence, continuous drinking behavior (H)

## 2025-05-30 NOTE — PLAN OF CARE
Goal Outcome Evaluation:     Plan of Care Reviewed With: patient   Assessment completed in patient room. playing card while waiting for her discharge as reported by patient. Happy to be discharged, reported feeling good while denying all psych symptoms including anxiety, depression, thoughts of harming self or others. Denies hallucination, no evidence of paranoia noted or reported. Contracted for safety. Will continue to monitor and report.

## 2025-05-30 NOTE — PLAN OF CARE
Problem: Adult Inpatient Plan of Care  Goal: Plan of Care Review  Description: The Plan of Care Review/Shift note should be completed every shift.  The Outcome Evaluation is a brief statement about your assessment that the patient is improving, declining, or no change.  This information will be displayed automatically on your shiftnote.  Outcome: Progressing  Flowsheets (Taken 5/30/2025 1317)  Plan of Care Reviewed With: patient  Pt was visible in the milieu,guarded, approachable. Denied SI/SIB AH or VH.   Compliant with scheduled meds, denied any side effects.  Pt reported good appetite and good sleep.  Pt is excited about discharging today, discharge order in place, mom will be picking pt up at 6pm. Reported to oncoming staff.

## 2025-05-30 NOTE — PROGRESS NOTES
Rehab Group    Start time: 1315  End time: 1415  Patient time total: 45 minutes    attended full group    #4 attended   Group Type: art   Group Topic Covered: activity therapy       Group Session Detail:  Art Therapy directive was to create a painting project/wooden plaque with either a personal intention word or positive affirmation.  Pts were also given the option of open studio/clinic to finish projects and/or start a new one.  Goals of directive: emotional expression, emotional regulation, media exploration     Patient Response/Contribution:  cooperative with task       Patient Detail:    Pt was an engaged participant, focused on completing a fuse bead project from a previous group. Pt completed the project in this group.  Pts mood was calm, social and appropriate boundaries with peers.      Activity Therapy Per 15 min ()      Patient Active Problem List   Diagnosis    Attention deficit hyperactivity disorder (ADHD), combined type    Severe recurrent major depression without psychotic features (H)    Suicidal ideation    Alcohol intoxication delirium    Major depressive disorder, remission status unspecified, unspecified whether recurrent    Alcohol dependence, continuous drinking behavior (H)

## 2025-05-30 NOTE — PLAN OF CARE
"Team Note Due:  Monday    Assessment/Intervention/Current Symtoms and Care Coordination:  Chart review and met with team, discussed pt progress, symptomology, and response to treatment.  Discussed the discharge plan and any potential impediments to discharge.    [facility] met with pt and pt signed ROIs for treatment programming referrals. Pt stated that she wants to wait for [facility]. CTC explained that her parents insurance is not able to be confirmed yet through COBRA, so there is a possibility that it won't be covered under insurance. Pt continues to want to go to [place]. CTC informed pt that CTC will discuss this with pt's parents.     [facility] made p.c to Sarah. [facility] discussed programs and referral status as well as pt's wishes to return to [place]. Sarah reports that they still do not have the information on COBRA coverage. [facility] discussed connecting to this program outpatient. [facility] discussed other referrals that were made and informed of where their information will be for pt to connect to if [facility] does not work out. Sarah would like to pick pt up today. She has concerns on if pt needs to be brought back to hospital, [facility] discussed [facility] as a possible option. She is concerned about pt drinking when she is at home.     [facility] sent referral internally to [facility] dx IOP track informing of pt's programmatic status needs/follow up.     [facility] met with pt and discussed discharge plan. CTC informed pt that [facility] has not been set up again because parents still do not have the COBRA coverage information to provide to [facility]. CTC informed pt that they will have to work with [facility] to provide this information and if insurance doesn't cover it, [facility] discussed the other programs pt was referred to. Pt states that they want to attend a program. Pt reports that she doesn't plan to drink when at home. Pt states that she wants her \"voluntary status to be removed.\" Pt acknowledges that she signed " in voluntarily but that she didn't think her stay would be this long. CTC discussed the 12 hour intent that she can sign, but that she is being discharged anyway at 6pm today. CTC discussed making a report to patient relations.     Discharge Plan or Goal:  Dispo Plan: Return home with referrals to programmatic care.   Discharge Date/ time: 5/30 at 6pm  Transportation: Mom to   AVS Completed: Yes [x] No[]  Provisional Discharge: Yes[] No[x]       Barriers to Discharge:  None     Referral Status:  Dual Dx IOP vs [facility] dual dx [facility]. Pt wants to attend [facility] program, but parents need to provide insurance information to this program to ensure it is covered. Pt was discharged prior to parents having this information. She was also [facility], [facility] and [facility] as options if insurance does not cover [facility]. Pt was scheduled with psychiatry.      Legal Status:  72 hour hold starting 5/17/25 at 604 - but voluntary as of 5/19 @1641  [employer]: [place]  File Number:   Start and expiration date of commitment:     Contacts (include JOSHUA status):  JOSHUA for Pt's mom   Sarah Briggs [phone]      Upcoming Meetings and Dates/Important Information and next steps:

## 2025-05-30 NOTE — PROGRESS NOTES
Pt is alert and oriented to time, place, person and situation and visible on the unit. On assessment, pt denies all psych symptoms including anxiety, depression, SI/SIB and contracted for safety. No illusion, delusion and hallucination noted, pt does not appear to be responding to internal stimuli.  all personal belongings sent with patient after discharge instruction were given to patient and she was receptive and showed understanding of all content. At the time of discharge pt continues to deny all psych symptoms.  resources to utilize in case of crisis were discoursed.Pt was happy to be discharged and left @ 6: 20 pm with her mom.   no
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
